# Patient Record
Sex: MALE | Race: ASIAN | NOT HISPANIC OR LATINO
[De-identification: names, ages, dates, MRNs, and addresses within clinical notes are randomized per-mention and may not be internally consistent; named-entity substitution may affect disease eponyms.]

---

## 2021-01-06 PROBLEM — Z00.00 ENCOUNTER FOR PREVENTIVE HEALTH EXAMINATION: Status: ACTIVE | Noted: 2021-01-06

## 2021-01-07 ENCOUNTER — APPOINTMENT (OUTPATIENT)
Dept: UROLOGY | Facility: CLINIC | Age: 41
End: 2021-01-07

## 2021-01-14 ENCOUNTER — APPOINTMENT (OUTPATIENT)
Dept: UROLOGY | Facility: CLINIC | Age: 41
End: 2021-01-14

## 2023-12-27 ENCOUNTER — INPATIENT (INPATIENT)
Facility: HOSPITAL | Age: 43
LOS: 1 days | Discharge: HOME | End: 2023-12-29
Attending: HOSPITALIST | Admitting: HOSPITALIST
Payer: COMMERCIAL

## 2023-12-27 VITALS
TEMPERATURE: 98 F | SYSTOLIC BLOOD PRESSURE: 153 MMHG | RESPIRATION RATE: 16 BRPM | DIASTOLIC BLOOD PRESSURE: 105 MMHG | HEART RATE: 75 BPM | OXYGEN SATURATION: 98 %

## 2023-12-27 DIAGNOSIS — R07.9 CHEST PAIN, UNSPECIFIED: ICD-10-CM

## 2023-12-27 DIAGNOSIS — I10 ESSENTIAL (PRIMARY) HYPERTENSION: ICD-10-CM

## 2023-12-27 DIAGNOSIS — E11.9 TYPE 2 DIABETES MELLITUS WITHOUT COMPLICATIONS: ICD-10-CM

## 2023-12-27 DIAGNOSIS — R07.89 OTHER CHEST PAIN: ICD-10-CM

## 2023-12-27 DIAGNOSIS — R74.01 ELEVATION OF LEVELS OF LIVER TRANSAMINASE LEVELS: ICD-10-CM

## 2023-12-27 LAB
ALBUMIN SERPL ELPH-MCNC: 4.2 G/DL — SIGNIFICANT CHANGE UP (ref 3.3–5)
ALBUMIN SERPL ELPH-MCNC: 4.2 G/DL — SIGNIFICANT CHANGE UP (ref 3.3–5)
ALP SERPL-CCNC: 119 U/L — SIGNIFICANT CHANGE UP (ref 40–120)
ALP SERPL-CCNC: 119 U/L — SIGNIFICANT CHANGE UP (ref 40–120)
ALT FLD-CCNC: 96 U/L — HIGH (ref 4–41)
ALT FLD-CCNC: 96 U/L — HIGH (ref 4–41)
ANION GAP SERPL CALC-SCNC: 12 MMOL/L — SIGNIFICANT CHANGE UP (ref 7–14)
ANION GAP SERPL CALC-SCNC: 12 MMOL/L — SIGNIFICANT CHANGE UP (ref 7–14)
AST SERPL-CCNC: 50 U/L — HIGH (ref 4–40)
AST SERPL-CCNC: 50 U/L — HIGH (ref 4–40)
BASOPHILS # BLD AUTO: 0.05 K/UL — SIGNIFICANT CHANGE UP (ref 0–0.2)
BASOPHILS # BLD AUTO: 0.05 K/UL — SIGNIFICANT CHANGE UP (ref 0–0.2)
BASOPHILS NFR BLD AUTO: 0.6 % — SIGNIFICANT CHANGE UP (ref 0–2)
BASOPHILS NFR BLD AUTO: 0.6 % — SIGNIFICANT CHANGE UP (ref 0–2)
BILIRUB SERPL-MCNC: 0.4 MG/DL — SIGNIFICANT CHANGE UP (ref 0.2–1.2)
BILIRUB SERPL-MCNC: 0.4 MG/DL — SIGNIFICANT CHANGE UP (ref 0.2–1.2)
BUN SERPL-MCNC: 10 MG/DL — SIGNIFICANT CHANGE UP (ref 7–23)
BUN SERPL-MCNC: 10 MG/DL — SIGNIFICANT CHANGE UP (ref 7–23)
CALCIUM SERPL-MCNC: 9.1 MG/DL — SIGNIFICANT CHANGE UP (ref 8.4–10.5)
CALCIUM SERPL-MCNC: 9.1 MG/DL — SIGNIFICANT CHANGE UP (ref 8.4–10.5)
CHLORIDE SERPL-SCNC: 98 MMOL/L — SIGNIFICANT CHANGE UP (ref 98–107)
CHLORIDE SERPL-SCNC: 98 MMOL/L — SIGNIFICANT CHANGE UP (ref 98–107)
CO2 SERPL-SCNC: 26 MMOL/L — SIGNIFICANT CHANGE UP (ref 22–31)
CO2 SERPL-SCNC: 26 MMOL/L — SIGNIFICANT CHANGE UP (ref 22–31)
CREAT SERPL-MCNC: 0.72 MG/DL — SIGNIFICANT CHANGE UP (ref 0.5–1.3)
CREAT SERPL-MCNC: 0.72 MG/DL — SIGNIFICANT CHANGE UP (ref 0.5–1.3)
EGFR: 116 ML/MIN/1.73M2 — SIGNIFICANT CHANGE UP
EGFR: 116 ML/MIN/1.73M2 — SIGNIFICANT CHANGE UP
EOSINOPHIL # BLD AUTO: 0.14 K/UL — SIGNIFICANT CHANGE UP (ref 0–0.5)
EOSINOPHIL # BLD AUTO: 0.14 K/UL — SIGNIFICANT CHANGE UP (ref 0–0.5)
EOSINOPHIL NFR BLD AUTO: 1.7 % — SIGNIFICANT CHANGE UP (ref 0–6)
EOSINOPHIL NFR BLD AUTO: 1.7 % — SIGNIFICANT CHANGE UP (ref 0–6)
GLUCOSE BLDC GLUCOMTR-MCNC: 193 MG/DL — HIGH (ref 70–99)
GLUCOSE BLDC GLUCOMTR-MCNC: 193 MG/DL — HIGH (ref 70–99)
GLUCOSE BLDC GLUCOMTR-MCNC: 212 MG/DL — HIGH (ref 70–99)
GLUCOSE BLDC GLUCOMTR-MCNC: 212 MG/DL — HIGH (ref 70–99)
GLUCOSE SERPL-MCNC: 262 MG/DL — HIGH (ref 70–99)
GLUCOSE SERPL-MCNC: 262 MG/DL — HIGH (ref 70–99)
HCT VFR BLD CALC: 48.1 % — SIGNIFICANT CHANGE UP (ref 39–50)
HCT VFR BLD CALC: 48.1 % — SIGNIFICANT CHANGE UP (ref 39–50)
HGB BLD-MCNC: 16.2 G/DL — SIGNIFICANT CHANGE UP (ref 13–17)
HGB BLD-MCNC: 16.2 G/DL — SIGNIFICANT CHANGE UP (ref 13–17)
IANC: 5.12 K/UL — SIGNIFICANT CHANGE UP (ref 1.8–7.4)
IANC: 5.12 K/UL — SIGNIFICANT CHANGE UP (ref 1.8–7.4)
IMM GRANULOCYTES NFR BLD AUTO: 0.5 % — SIGNIFICANT CHANGE UP (ref 0–0.9)
IMM GRANULOCYTES NFR BLD AUTO: 0.5 % — SIGNIFICANT CHANGE UP (ref 0–0.9)
LYMPHOCYTES # BLD AUTO: 2.37 K/UL — SIGNIFICANT CHANGE UP (ref 1–3.3)
LYMPHOCYTES # BLD AUTO: 2.37 K/UL — SIGNIFICANT CHANGE UP (ref 1–3.3)
LYMPHOCYTES # BLD AUTO: 28.2 % — SIGNIFICANT CHANGE UP (ref 13–44)
LYMPHOCYTES # BLD AUTO: 28.2 % — SIGNIFICANT CHANGE UP (ref 13–44)
MCHC RBC-ENTMCNC: 27.5 PG — SIGNIFICANT CHANGE UP (ref 27–34)
MCHC RBC-ENTMCNC: 27.5 PG — SIGNIFICANT CHANGE UP (ref 27–34)
MCHC RBC-ENTMCNC: 33.7 GM/DL — SIGNIFICANT CHANGE UP (ref 32–36)
MCHC RBC-ENTMCNC: 33.7 GM/DL — SIGNIFICANT CHANGE UP (ref 32–36)
MCV RBC AUTO: 81.5 FL — SIGNIFICANT CHANGE UP (ref 80–100)
MCV RBC AUTO: 81.5 FL — SIGNIFICANT CHANGE UP (ref 80–100)
MONOCYTES # BLD AUTO: 0.68 K/UL — SIGNIFICANT CHANGE UP (ref 0–0.9)
MONOCYTES # BLD AUTO: 0.68 K/UL — SIGNIFICANT CHANGE UP (ref 0–0.9)
MONOCYTES NFR BLD AUTO: 8.1 % — SIGNIFICANT CHANGE UP (ref 2–14)
MONOCYTES NFR BLD AUTO: 8.1 % — SIGNIFICANT CHANGE UP (ref 2–14)
NEUTROPHILS # BLD AUTO: 5.12 K/UL — SIGNIFICANT CHANGE UP (ref 1.8–7.4)
NEUTROPHILS # BLD AUTO: 5.12 K/UL — SIGNIFICANT CHANGE UP (ref 1.8–7.4)
NEUTROPHILS NFR BLD AUTO: 60.9 % — SIGNIFICANT CHANGE UP (ref 43–77)
NEUTROPHILS NFR BLD AUTO: 60.9 % — SIGNIFICANT CHANGE UP (ref 43–77)
NRBC # BLD: 0 /100 WBCS — SIGNIFICANT CHANGE UP (ref 0–0)
NRBC # BLD: 0 /100 WBCS — SIGNIFICANT CHANGE UP (ref 0–0)
NRBC # FLD: 0 K/UL — SIGNIFICANT CHANGE UP (ref 0–0)
NRBC # FLD: 0 K/UL — SIGNIFICANT CHANGE UP (ref 0–0)
PLATELET # BLD AUTO: 288 K/UL — SIGNIFICANT CHANGE UP (ref 150–400)
PLATELET # BLD AUTO: 288 K/UL — SIGNIFICANT CHANGE UP (ref 150–400)
POTASSIUM SERPL-MCNC: 4.2 MMOL/L — SIGNIFICANT CHANGE UP (ref 3.5–5.3)
POTASSIUM SERPL-MCNC: 4.2 MMOL/L — SIGNIFICANT CHANGE UP (ref 3.5–5.3)
POTASSIUM SERPL-SCNC: 4.2 MMOL/L — SIGNIFICANT CHANGE UP (ref 3.5–5.3)
POTASSIUM SERPL-SCNC: 4.2 MMOL/L — SIGNIFICANT CHANGE UP (ref 3.5–5.3)
PROT SERPL-MCNC: 7.9 G/DL — SIGNIFICANT CHANGE UP (ref 6–8.3)
PROT SERPL-MCNC: 7.9 G/DL — SIGNIFICANT CHANGE UP (ref 6–8.3)
RBC # BLD: 5.9 M/UL — HIGH (ref 4.2–5.8)
RBC # BLD: 5.9 M/UL — HIGH (ref 4.2–5.8)
RBC # FLD: 12 % — SIGNIFICANT CHANGE UP (ref 10.3–14.5)
RBC # FLD: 12 % — SIGNIFICANT CHANGE UP (ref 10.3–14.5)
SODIUM SERPL-SCNC: 136 MMOL/L — SIGNIFICANT CHANGE UP (ref 135–145)
SODIUM SERPL-SCNC: 136 MMOL/L — SIGNIFICANT CHANGE UP (ref 135–145)
TROPONIN T, HIGH SENSITIVITY RESULT: 6 NG/L — SIGNIFICANT CHANGE UP
TROPONIN T, HIGH SENSITIVITY RESULT: 6 NG/L — SIGNIFICANT CHANGE UP
WBC # BLD: 8.4 K/UL — SIGNIFICANT CHANGE UP (ref 3.8–10.5)
WBC # BLD: 8.4 K/UL — SIGNIFICANT CHANGE UP (ref 3.8–10.5)
WBC # FLD AUTO: 8.4 K/UL — SIGNIFICANT CHANGE UP (ref 3.8–10.5)
WBC # FLD AUTO: 8.4 K/UL — SIGNIFICANT CHANGE UP (ref 3.8–10.5)

## 2023-12-27 PROCEDURE — 71046 X-RAY EXAM CHEST 2 VIEWS: CPT | Mod: 26

## 2023-12-27 PROCEDURE — 99285 EMERGENCY DEPT VISIT HI MDM: CPT

## 2023-12-27 RX ORDER — DEXTROSE 50 % IN WATER 50 %
25 SYRINGE (ML) INTRAVENOUS ONCE
Refills: 0 | Status: DISCONTINUED | OUTPATIENT
Start: 2023-12-27 | End: 2023-12-29

## 2023-12-27 RX ORDER — INSULIN LISPRO 100/ML
VIAL (ML) SUBCUTANEOUS AT BEDTIME
Refills: 0 | Status: DISCONTINUED | OUTPATIENT
Start: 2023-12-27 | End: 2023-12-29

## 2023-12-27 RX ORDER — DEXTROSE 50 % IN WATER 50 %
12.5 SYRINGE (ML) INTRAVENOUS ONCE
Refills: 0 | Status: DISCONTINUED | OUTPATIENT
Start: 2023-12-27 | End: 2023-12-29

## 2023-12-27 RX ORDER — ASPIRIN/CALCIUM CARB/MAGNESIUM 324 MG
325 TABLET ORAL ONCE
Refills: 0 | Status: COMPLETED | OUTPATIENT
Start: 2023-12-27 | End: 2023-12-27

## 2023-12-27 RX ORDER — GLUCAGON INJECTION, SOLUTION 0.5 MG/.1ML
1 INJECTION, SOLUTION SUBCUTANEOUS ONCE
Refills: 0 | Status: DISCONTINUED | OUTPATIENT
Start: 2023-12-27 | End: 2023-12-29

## 2023-12-27 RX ORDER — DEXTROSE 50 % IN WATER 50 %
15 SYRINGE (ML) INTRAVENOUS ONCE
Refills: 0 | Status: DISCONTINUED | OUTPATIENT
Start: 2023-12-27 | End: 2023-12-29

## 2023-12-27 RX ORDER — SODIUM CHLORIDE 9 MG/ML
1000 INJECTION, SOLUTION INTRAVENOUS
Refills: 0 | Status: DISCONTINUED | OUTPATIENT
Start: 2023-12-27 | End: 2023-12-29

## 2023-12-27 RX ORDER — METOPROLOL TARTRATE 50 MG
25 TABLET ORAL EVERY 6 HOURS
Refills: 0 | Status: DISCONTINUED | OUTPATIENT
Start: 2023-12-27 | End: 2023-12-29

## 2023-12-27 RX ORDER — INSULIN LISPRO 100/ML
VIAL (ML) SUBCUTANEOUS
Refills: 0 | Status: DISCONTINUED | OUTPATIENT
Start: 2023-12-27 | End: 2023-12-29

## 2023-12-27 RX ORDER — PANTOPRAZOLE SODIUM 20 MG/1
40 TABLET, DELAYED RELEASE ORAL
Refills: 0 | Status: DISCONTINUED | OUTPATIENT
Start: 2023-12-27 | End: 2023-12-29

## 2023-12-27 RX ORDER — ASPIRIN/CALCIUM CARB/MAGNESIUM 324 MG
81 TABLET ORAL DAILY
Refills: 0 | Status: DISCONTINUED | OUTPATIENT
Start: 2023-12-27 | End: 2023-12-29

## 2023-12-27 RX ADMIN — Medication 325 MILLIGRAM(S): at 15:49

## 2023-12-27 RX ADMIN — Medication 25 MILLIGRAM(S): at 23:23

## 2023-12-27 NOTE — ED PROVIDER NOTE - PROGRESS NOTE DETAILS
MD Summer: Pt signed out to me by Dr Chavira. Labs resulted, troponin 6. EP note recommending metoprolol to slow heart rate and cardiac CTA. Spoke with Dr Geller, cardiology attending and pt will be admitted to his service.

## 2023-12-27 NOTE — CONSULT NOTE ADULT - SUBJECTIVE AND OBJECTIVE BOX
EP Attending  HISTORY OF PRESENT ILLNESS: HPI:  Mr Marshall is a very pleasant 43yoM who presents to our office with worsening/progressive chest discomfort.  Described as heaviness that is substernal/mid-chest, nonradiating, occuring while climbing stairs at work.  He is a .  Has HTN, and abdominal obesity.  Takes Amlodipine for HTN and PPI for GERD.    PAST MEDICAL & SURGICAL HISTORY:  HTN  Obesity    MEDICATIONS  (STANDING):  metoprolol tartrate 25 milliGRAM(s) Oral every 6 hours      Allergies    No Known Allergies    Intolerances      FAMILY HISTORY:    Non-contributary for premature coronary disease or sudden cardiac death    SOCIAL HISTORY:    [x ] Non-smoker  [ ] Smoker  [x ] Alcohol    PHYSICAL EXAM:  T(C): 36.7 (12-27-23 @ 15:52), Max: 36.8 (12-27-23 @ 14:47)  HR: 87 (12-27-23 @ 15:52) (75 - 87)  BP: 125/94 (12-27-23 @ 15:52) (125/94 - 153/105)  RR: 16 (12-27-23 @ 15:52) (16 - 16)  SpO2: 98% (12-27-23 @ 15:52) (98% - 98%)  Wt(kg): --    General: Well nourished, no acute distress, alert and oriented x 3  Head: normocephalic, no trauma  Neck: no JVD, no bruit, supple, not enlarged  CV: S1S2, no S3, regular rate, rhythm is SINUS, no murmurs.    Lungs: clear BL, no rales or wheezes  Abdomen: bowel sounds +, soft, nontender, nondistended  Extremities: no clubbing, cyanosis or edema  Neuro: Moves all 4 extremities, sensation intact x 4 extremities  Skin: warm and moist, normal turgor  Psych: Mood and affect are appropriate for circumstances  MSK: normal range of motion and strength x4 extremities.    TELEMETRY: NSR	    ECG: NSR, lateral T-waves flat. 	  Echo: NSR  NST: normal perfusion, 11.5min of exercise on Martir protocol without ischemic changes on EKG.  	  	  LABS:	 	                          16.2   8.40  )-----------( 288      ( 27 Dec 2023 15:49 )             48.1     12-27    136  |  98  |  10  ----------------------------<  262<H>  4.2   |  26  |  0.72    Ca    9.1      27 Dec 2023 15:49    TPro  7.9  /  Alb  4.2  /  TBili  0.4  /  DBili  x   /  AST  50<H>  /  ALT  96<H>  /  AlkPhos  119  12-27    ASSESSMENT/PLAN: Mr Marshall is a very pleasant 43y male here with persistent, progressive chest pain.  Concerning for angina, which is in contrast to his reassuring office workup with normal echo, and good exercise capacity on treadmill / normal nuclear myocardial perfusion imaging.  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  Heartrate a bit too fast, 70s-80s at rest, for desired test: Cardiac CTA.  Continue metoprolol for hypertension.  Will start metoprolol to slow heartrate down towards 60.  Holding parameter for drug set at 50bpm.  If we find any coronary calcium, will have evidence to start Statin therapy early.  Hopefully, no multivessel CAD (patient concerned about CAD due to demographics and family history).  Will follow with you.      Myles Keller M.D.  Cardiac Electrophysiology    office [phone]  pager [phone] EP Attending  HISTORY OF PRESENT ILLNESS: HPI:  Mr Marshall is a very pleasant 43yoM who presents to our office with worsening/progressive chest discomfort.  Described as heaviness that is substernal/mid-chest, nonradiating, occuring while climbing stairs at work.  He is a .  Has HTN, and abdominal obesity.  Takes Amlodipine for HTN and PPI for GERD.    PAST MEDICAL & SURGICAL HISTORY:  HTN  Obesity    MEDICATIONS  (STANDING):  metoprolol tartrate 25 milliGRAM(s) Oral every 6 hours      Allergies    No Known Allergies    Intolerances      FAMILY HISTORY:    Non-contributary for premature coronary disease or sudden cardiac death    SOCIAL HISTORY:    [x ] Non-smoker  [ ] Smoker  [x ] Alcohol    PHYSICAL EXAM:  T(C): 36.7 (12-27-23 @ 15:52), Max: 36.8 (12-27-23 @ 14:47)  HR: 87 (12-27-23 @ 15:52) (75 - 87)  BP: 125/94 (12-27-23 @ 15:52) (125/94 - 153/105)  RR: 16 (12-27-23 @ 15:52) (16 - 16)  SpO2: 98% (12-27-23 @ 15:52) (98% - 98%)  Wt(kg): --    General: Well nourished, no acute distress, alert and oriented x 3  Head: normocephalic, no trauma  Neck: no JVD, no bruit, supple, not enlarged  CV: S1S2, no S3, regular rate, rhythm is SINUS, no murmurs.    Lungs: clear BL, no rales or wheezes  Abdomen: bowel sounds +, soft, nontender, nondistended  Extremities: no clubbing, cyanosis or edema  Neuro: Moves all 4 extremities, sensation intact x 4 extremities  Skin: warm and moist, normal turgor  Psych: Mood and affect are appropriate for circumstances  MSK: normal range of motion and strength x4 extremities.    TELEMETRY: NSR	    ECG: NSR, lateral T-waves flat. 	  Echo: NSR  NST: normal perfusion, 11.5min of exercise on Martir protocol without ischemic changes on EKG.  	  	  LABS:	 	                          16.2   8.40  )-----------( 288      ( 27 Dec 2023 15:49 )             48.1     12-27    136  |  98  |  10  ----------------------------<  262<H>  4.2   |  26  |  0.72    Ca    9.1      27 Dec 2023 15:49    TPro  7.9  /  Alb  4.2  /  TBili  0.4  /  DBili  x   /  AST  50<H>  /  ALT  96<H>  /  AlkPhos  119  12-27    ASSESSMENT/PLAN: Mr Marshall is a very pleasant 43y male here with persistent, progressive chest pain.  Concerning for angina, which is in contrast to his reassuring office workup with normal echo, and good exercise capacity on treadmill / normal nuclear myocardial perfusion imaging.  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  Heartrate a bit too fast, 70s-80s at rest, for desired test: Cardiac CTA.  Continue metoprolol for hypertension.  Will start metoprolol to slow heartrate down towards 60.  Holding parameter for drug set at 50bpm.  If we find any coronary calcium, will have evidence to start Statin therapy early.  Hopefully, no multivessel CAD (patient concerned about CAD due to demographics and family history).  Will follow with you.      Myles Keller M.D.  Cardiac Electrophysiology    office [phone]  pager [phone]

## 2023-12-27 NOTE — ED ADULT NURSE REASSESSMENT NOTE - NS ED NURSE REASSESS COMMENT FT1
Report given to melecio PAINTER, patient resting comfortably in stretcher, breathing even and unlabored. Offers no complaints at this time. Pending CTA & US. NSR on cardiac monitor.

## 2023-12-27 NOTE — ED ADULT TRIAGE NOTE - CHIEF COMPLAINT QUOTE
Patient c/o chest pain x few months, sent in by MD for admission. Denies SOB, fever, cough, dizziness, nausea. Phx HTN

## 2023-12-27 NOTE — ED PROVIDER NOTE - OBJECTIVE STATEMENT
43M, preDM, ?HTN (no meds), who is sent from cardiology clinic for possible admission. For the past several months, reports L sided chest pain. Non-radiating. Non-smoker. Denies illicits. Denies hx of VTE. No leg swelling. Pain is becoming more frequent and thus he was sent from clinic (MD Gustafson) for possible admission. No belly pain, nvd, dysuria, hematuria, recent travel, trauma, syncope.

## 2023-12-27 NOTE — ED ADULT NURSE NOTE - OBJECTIVE STATEMENT
43 year old male, received to [facility]. A&Ox4, ambulatory. Respirations equal and unlabored. Past medical history of kidney stones. Pt c/o non radiating midsternal chest pain x1 month. No pallor or diaphoresis noted. Pt send in by MD for cardiac work up. Pt denies SOB, palpitations, dizziness, blurry vision, headache, numbness, tingling, fevers, chills, any other complaints. Neuro intact. PERRLA. EKG performed and placed in chart. Skin dry and intact. 20G IV placed to L antecubital space. Labs obtained. Medicated as per EMR orders. Pending XR. No acute distress noted. Safety maintained. 43 year old male, received to [facility]. A&Ox4, ambulatory. Respirations equal and unlabored. Past medical history of kidney stones. Pt c/o non radiating midsternal chest pain x1 month. No pallor or diaphoresis noted. Pt send in by MD for cardiac work up. Pt denies SOB, palpitations, dizziness, blurry vision, headache, numbness, tingling, fevers, chills, any other complaints. Neuro intact. PERRLA. EKG performed and placed in chart. Skin dry and intact. 20G IV placed to L antecubital space. Labs obtained. Medicated as per EMR orders. Pending XR. No acute distress noted. Safety maintained.

## 2023-12-27 NOTE — CHART NOTE - NSCHARTNOTEFT_GEN_A_CORE
Sent from office for chest pain by Dr. Gustafson, HAs had a stress in [place] that was normal but continues to have pain, will need cath vs CCTA depending on labs and EKG    Maria Luisa Geller MD  Pager: [phone] Sent from office for chest pain by Dr. Gustafson, HAs had a stress in [place] that was normal but continues to have pain, will need cath vs CCTA depending on labs and EKG    Maria Luisa Geller MD  Pager: [phone]

## 2023-12-27 NOTE — CONSULT NOTE ADULT - SUBJECTIVE AND OBJECTIVE BOX
INTERVENTIONAL CARDIOLOGY  **************************************    DATE OF SERVICE: 12-27-23    HISTORY OF PRESENT ILLNESS: HPI:  Pt is a 43M,  pmh of [facility], who is sent from office for central burning chest pain that can get worse with food. He also reports an exertional component and some SOB. 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 Pain is left sided, non radiating denies any palpitations, orthopnea, PND or recent fevers. HE does not smoke, denies any illicit drug use No belly pain, nvd, dysuria, hematuria, recent travel, trauma, syncope.    PMHX: Hypertension    PSHX: Status post left elbow surgery.    Social HX:   Alcohol - SOCIAL DRINKER   Smoking - NEVER SMOKED    Fam HX:   Positive for coronary artery disease    Allergies: No Known Allergies    Medications:  amlodipine 5 mg tablet 1 TABLET DAILY  naproxen 500 mg tablet,delayed release 1 TABLET Q12H PRN PAIN. TAKE WITH MEALS  omeprazole 20 mg tablet,delayed release 1 TABLET DAILY        REVIEW OF SYSTEMS:  [ ]chest pain  [  ]shortness of breath  [  ]palpitations  [  ]syncope  [ ]near syncope [ ]upper extremity weakness   [ ] lower extremity weakness  [  ]diplopia  [  ]altered mental status   [  ]fevers  [ ]chills [ ]nausea  [ ]vomiting  [  ]dysphagia    [ ]abdominal pain  [ ]melena  [ ]BRBPR    [  ]epistaxis  [  ]rash    [ ]lower extremity edema    [X] All others negative	  [ ] Unable to obtain      LABS:	 	    CARDIAC MARKERS:               16.2   8.40  )-----------( 288      ( 27 Dec 2023 15:49 )             48.1     Hb Trend: 16.2<--    12-27    136  |  98  |  10  ----------------------------<  262<H>  4.2   |  26  |  0.72    Ca    9.1      27 Dec 2023 15:49    TPro  7.9  /  Alb  4.2  /  TBili  0.4  /  DBili  x   /  AST  50<H>  /  ALT  96<H>  /  AlkPhos  119  12-27    Creatinine Trend: 0.72<--        PHYSICAL EXAM:  T(C): 36.7 (12-27-23 @ 18:05), Max: 36.8 (12-27-23 @ 14:47)  HR: 87 (12-27-23 @ 18:05) (75 - 87)  BP: 125/94 (12-27-23 @ 18:05) (125/94 - 153/105)  RR: 16 (12-27-23 @ 15:52) (16 - 16)  SpO2: 98% (12-27-23 @ 15:52) (98% - 98%)  Wt(kg): --     I&O's Summary      General:  Alert and Oriented * 3.   Head: Normocephalic and atraumatic.   Neck: No JVD. No bruits. Supple. Does not appear to be enlarged.   Cardiovascular: + S1,S2 ; RRR Soft systolic murmur at the left lower sternal border. No rubs noted.    Lungs: CTA b/l. No rhonchi, rales or wheezes.   Abdomen: + BS, soft. Non tender. Non distended. No rebound. No guarding.   Extremities: No clubbing/cyanosis/edema.   Neurologic: Moves all four extremities. Full range of motion.   Skin: Warm and moist. The patient's skin has normal elasticity and good skin turgor.   Psychiatric: Appropriate mood and affect.  Musculoskeletal: Normal range of motion, normal strength     TELEMETRY: 	  NSR    ECG:  	NSR    Stress 06/2023  Normal myocardial perfusion SPECT images No evidence of stress induced ischemia or infarction.  Normal left ventricular size and function.  Calculated EF is: 65%     Holter 06/2023  Sinus rhythm.  Rare APCs and VPCs    TTE  1. Normal left ventricular size and function.  Mild diastolic dysfunction.  2. Normal left atrial size  3. Right atrial cavity is normal in size.  4. Normal right ventricular size and function.  5. Normal trileaflet aortic valve opening.  6. Normal mitral valve opening.  7. Normal appearing tricuspid valve with mild tricuspid  regurgitation.  8. Pulmonic valve is grossly normal, yet poorly visualized  with no doppler evidence for pulmonic stenosis.  9. No evidence of significant pericardial effusion.  10. The aortic root is normal.  11. Normal pulmonary artery.  12. IVC is normal with respiratory variation.  FULL STUDY DONE INCLUDING M-MODE RECORDING,  SPECTRAL DOPPLER AND COLOR FLOW  ECHOCARDIOGRAPHY      ASSESSMENT/PLAN: Pt is a 43M,  pmh of [facility], who is sent from office for central burning chest pain that can get worse with food. He also reports an exertional component and some SOB. 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 Pain is left sided, non radiating denies any palpitations, orthopnea, PND or recent fevers. HE does not smoke, denies any illicit drug use No belly pain, nvd, dysuria, hematuria, recent travel, trauma, syncope.    1. Chest Pain  - atypical with typical features  - trop negative, EKG non ischemic  - check CCTA  - start Metoprolol 25 1 6 to lower heart rate for CCTA  - start PP  - c/w Amlodipine  - if CCTA negative will start NSAIDS for MSK pain  - check RUQ u/s    Maria Luisa Geller MD  Pager: [phone]      INTERVENTIONAL CARDIOLOGY  **************************************    DATE OF SERVICE: 12-27-23    HISTORY OF PRESENT ILLNESS: HPI:  Pt is a 43M,  pmh of [facility], who is sent from office for central burning chest pain that can get worse with food. He also reports an exertional component and some SOB. 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 Pain is left sided, non radiating denies any palpitations, orthopnea, PND or recent fevers. HE does not smoke, denies any illicit drug use No belly pain, nvd, dysuria, hematuria, recent travel, trauma, syncope.    PMHX: Hypertension    PSHX: Status post left elbow surgery.    Social HX:   Alcohol - SOCIAL DRINKER   Smoking - NEVER SMOKED    Fam HX:   Positive for coronary artery disease    Allergies: No Known Allergies    Medications:  amlodipine 5 mg tablet 1 TABLET DAILY  naproxen 500 mg tablet,delayed release 1 TABLET Q12H PRN PAIN. TAKE WITH MEALS  omeprazole 20 mg tablet,delayed release 1 TABLET DAILY        REVIEW OF SYSTEMS:  [ ]chest pain  [  ]shortness of breath  [  ]palpitations  [  ]syncope  [ ]near syncope [ ]upper extremity weakness   [ ] lower extremity weakness  [  ]diplopia  [  ]altered mental status   [  ]fevers  [ ]chills [ ]nausea  [ ]vomiting  [  ]dysphagia    [ ]abdominal pain  [ ]melena  [ ]BRBPR    [  ]epistaxis  [  ]rash    [ ]lower extremity edema    [X] All others negative	  [ ] Unable to obtain      LABS:	 	    CARDIAC MARKERS:               16.2   8.40  )-----------( 288      ( 27 Dec 2023 15:49 )             48.1     Hb Trend: 16.2<--    12-27    136  |  98  |  10  ----------------------------<  262<H>  4.2   |  26  |  0.72    Ca    9.1      27 Dec 2023 15:49    TPro  7.9  /  Alb  4.2  /  TBili  0.4  /  DBili  x   /  AST  50<H>  /  ALT  96<H>  /  AlkPhos  119  12-27    Creatinine Trend: 0.72<--        PHYSICAL EXAM:  T(C): 36.7 (12-27-23 @ 18:05), Max: 36.8 (12-27-23 @ 14:47)  HR: 87 (12-27-23 @ 18:05) (75 - 87)  BP: 125/94 (12-27-23 @ 18:05) (125/94 - 153/105)  RR: 16 (12-27-23 @ 15:52) (16 - 16)  SpO2: 98% (12-27-23 @ 15:52) (98% - 98%)  Wt(kg): --     I&O's Summary      General:  Alert and Oriented * 3.   Head: Normocephalic and atraumatic.   Neck: No JVD. No bruits. Supple. Does not appear to be enlarged.   Cardiovascular: + S1,S2 ; RRR Soft systolic murmur at the left lower sternal border. No rubs noted.    Lungs: CTA b/l. No rhonchi, rales or wheezes.   Abdomen: + BS, soft. Non tender. Non distended. No rebound. No guarding.   Extremities: No clubbing/cyanosis/edema.   Neurologic: Moves all four extremities. Full range of motion.   Skin: Warm and moist. The patient's skin has normal elasticity and good skin turgor.   Psychiatric: Appropriate mood and affect.  Musculoskeletal: Normal range of motion, normal strength     TELEMETRY: 	  NSR    ECG:  	NSR    Stress 06/2023  Normal myocardial perfusion SPECT images No evidence of stress induced ischemia or infarction.  Normal left ventricular size and function.  Calculated EF is: 65%     Holter 06/2023  Sinus rhythm.  Rare APCs and VPCs    TTE  1. Normal left ventricular size and function.  Mild diastolic dysfunction.  2. Normal left atrial size  3. Right atrial cavity is normal in size.  4. Normal right ventricular size and function.  5. Normal trileaflet aortic valve opening.  6. Normal mitral valve opening.  7. Normal appearing tricuspid valve with mild tricuspid  regurgitation.  8. Pulmonic valve is grossly normal, yet poorly visualized  with no doppler evidence for pulmonic stenosis.  9. No evidence of significant pericardial effusion.  10. The aortic root is normal.  11. Normal pulmonary artery.  12. IVC is normal with respiratory variation.  FULL STUDY DONE INCLUDING M-MODE RECORDING,  SPECTRAL DOPPLER AND COLOR FLOW  ECHOCARDIOGRAPHY      ASSESSMENT/PLAN: Pt is a 43M,  pmh of [facility], who is sent from office for central burning chest pain that can get worse with food. He also reports an exertional component and some SOB. 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 Pain is left sided, non radiating denies any palpitations, orthopnea, PND or recent fevers. HE does not smoke, denies any illicit drug use No belly pain, nvd, dysuria, hematuria, recent travel, trauma, syncope.    1. Chest Pain  - atypical with typical features  - trop negative, EKG non ischemic  - check CCTA  - start Metoprolol 25 1 6 to lower heart rate for CCTA  - start PP  - c/w Amlodipine  - if CCTA negative will start NSAIDS for MSK pain  - check RUQ u/s    Maria Luisa Geller MD  Pager: [phone]      INTERVENTIONAL CARDIOLOGY  **************************************    DATE OF SERVICE: 12-27-23    HISTORY OF PRESENT ILLNESS: HPI:  Pt is a 43M,  pmh of [facility], who is sent from office for central burning chest pain that can get worse with food. He also reports an exertional component and some SOB. 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 Pain is left sided, non radiating denies any palpitations, orthopnea, PND or recent fevers. HE does not smoke, denies any illicit drug use No belly pain, nvd, dysuria, hematuria, recent travel, trauma, syncope.    PMHX: Hypertension    PSHX: Status post left elbow surgery.    Social HX:   Alcohol - SOCIAL DRINKER   Smoking - NEVER SMOKED    Fam HX:   Positive for coronary artery disease    Allergies: No Known Allergies    Medications:  amlodipine 5 mg tablet 1 TABLET DAILY  naproxen 500 mg tablet,delayed release 1 TABLET Q12H PRN PAIN. TAKE WITH MEALS  omeprazole 20 mg tablet,delayed release 1 TABLET DAILY        REVIEW OF SYSTEMS:  [ ]chest pain  [  ]shortness of breath  [  ]palpitations  [  ]syncope  [ ]near syncope [ ]upper extremity weakness   [ ] lower extremity weakness  [  ]diplopia  [  ]altered mental status   [  ]fevers  [ ]chills [ ]nausea  [ ]vomiting  [  ]dysphagia    [ ]abdominal pain  [ ]melena  [ ]BRBPR    [  ]epistaxis  [  ]rash    [ ]lower extremity edema    [X] All others negative	  [ ] Unable to obtain      LABS:	 	    CARDIAC MARKERS:               16.2   8.40  )-----------( 288      ( 27 Dec 2023 15:49 )             48.1     Hb Trend: 16.2<--    12-27    136  |  98  |  10  ----------------------------<  262<H>  4.2   |  26  |  0.72    Ca    9.1      27 Dec 2023 15:49    TPro  7.9  /  Alb  4.2  /  TBili  0.4  /  DBili  x   /  AST  50<H>  /  ALT  96<H>  /  AlkPhos  119  12-27    Creatinine Trend: 0.72<--        PHYSICAL EXAM:  T(C): 36.7 (12-27-23 @ 18:05), Max: 36.8 (12-27-23 @ 14:47)  HR: 87 (12-27-23 @ 18:05) (75 - 87)  BP: 125/94 (12-27-23 @ 18:05) (125/94 - 153/105)  RR: 16 (12-27-23 @ 15:52) (16 - 16)  SpO2: 98% (12-27-23 @ 15:52) (98% - 98%)  Wt(kg): --     I&O's Summary      General:  Alert and Oriented * 3.   Head: Normocephalic and atraumatic.   Neck: No JVD. No bruits. Supple. Does not appear to be enlarged.   Cardiovascular: + S1,S2 ; RRR Soft systolic murmur at the left lower sternal border. No rubs noted.    Lungs: CTA b/l. No rhonchi, rales or wheezes.   Abdomen: + BS, soft. Non tender. Non distended. No rebound. No guarding.   Extremities: No clubbing/cyanosis/edema.   Neurologic: Moves all four extremities. Full range of motion.   Skin: Warm and moist. The patient's skin has normal elasticity and good skin turgor.   Psychiatric: Appropriate mood and affect.  Musculoskeletal: Normal range of motion, normal strength     TELEMETRY: 	  NSR    ECG:  	NSR    Stress 06/2023  Normal myocardial perfusion SPECT images No evidence of stress induced ischemia or infarction.  Normal left ventricular size and function.  Calculated EF is: 65%     Holter 06/2023  Sinus rhythm.  Rare APCs and VPCs    TTE  1. Normal left ventricular size and function.  Mild diastolic dysfunction.  2. Normal left atrial size  3. Right atrial cavity is normal in size.  4. Normal right ventricular size and function.  5. Normal trileaflet aortic valve opening.  6. Normal mitral valve opening.  7. Normal appearing tricuspid valve with mild tricuspid  regurgitation.  8. Pulmonic valve is grossly normal, yet poorly visualized  with no doppler evidence for pulmonic stenosis.  9. No evidence of significant pericardial effusion.  10. The aortic root is normal.  11. Normal pulmonary artery.  12. IVC is normal with respiratory variation.  FULL STUDY DONE INCLUDING M-MODE RECORDING,  SPECTRAL DOPPLER AND COLOR FLOW  ECHOCARDIOGRAPHY      ASSESSMENT/PLAN: Pt is a 43M,  pmh of [facility], who is sent from office for central burning chest pain that can get worse with food. He also reports an exertional component and some SOB. 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 Pain is left sided, non radiating denies any palpitations, orthopnea, PND or recent fevers. HE does not smoke, denies any illicit drug use No belly pain, nvd, dysuria, hematuria, recent travel, trauma, syncope.    1. Chest Pain  - atypical with typical features  - trop negative, EKG non ischemic, recent negative stress where he exercised for almost 12 minus and had no perfusion defects on stress imaging  - check CCTA for atypical pain and risk factors  - start Metoprolol 25 q 6 hours to lower heart rate for CCTA  - start PPI  - c/w Amlodipine  - if CCTA negative will start NSAIDS for MSK pain  - check RUQ u/s    Maria Luisa Geller MD  Pager: [phone]      INTERVENTIONAL CARDIOLOGY  **************************************    DATE OF SERVICE: 12-27-23    HISTORY OF PRESENT ILLNESS: HPI:  Pt is a 43M,  pmh of [facility], who is sent from office for central burning chest pain that can get worse with food. He also reports an exertional component and some SOB. 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 Pain is left sided, non radiating denies any palpitations, orthopnea, PND or recent fevers. HE does not smoke, denies any illicit drug use No belly pain, nvd, dysuria, hematuria, recent travel, trauma, syncope.    PMHX: Hypertension    PSHX: Status post left elbow surgery.    Social HX:   Alcohol - SOCIAL DRINKER   Smoking - NEVER SMOKED    Fam HX:   Positive for coronary artery disease    Allergies: No Known Allergies    Medications:  amlodipine 5 mg tablet 1 TABLET DAILY  naproxen 500 mg tablet,delayed release 1 TABLET Q12H PRN PAIN. TAKE WITH MEALS  omeprazole 20 mg tablet,delayed release 1 TABLET DAILY        REVIEW OF SYSTEMS:  [ ]chest pain  [  ]shortness of breath  [  ]palpitations  [  ]syncope  [ ]near syncope [ ]upper extremity weakness   [ ] lower extremity weakness  [  ]diplopia  [  ]altered mental status   [  ]fevers  [ ]chills [ ]nausea  [ ]vomiting  [  ]dysphagia    [ ]abdominal pain  [ ]melena  [ ]BRBPR    [  ]epistaxis  [  ]rash    [ ]lower extremity edema    [X] All others negative	  [ ] Unable to obtain      LABS:	 	    CARDIAC MARKERS:               16.2   8.40  )-----------( 288      ( 27 Dec 2023 15:49 )             48.1     Hb Trend: 16.2<--    12-27    136  |  98  |  10  ----------------------------<  262<H>  4.2   |  26  |  0.72    Ca    9.1      27 Dec 2023 15:49    TPro  7.9  /  Alb  4.2  /  TBili  0.4  /  DBili  x   /  AST  50<H>  /  ALT  96<H>  /  AlkPhos  119  12-27    Creatinine Trend: 0.72<--        PHYSICAL EXAM:  T(C): 36.7 (12-27-23 @ 18:05), Max: 36.8 (12-27-23 @ 14:47)  HR: 87 (12-27-23 @ 18:05) (75 - 87)  BP: 125/94 (12-27-23 @ 18:05) (125/94 - 153/105)  RR: 16 (12-27-23 @ 15:52) (16 - 16)  SpO2: 98% (12-27-23 @ 15:52) (98% - 98%)  Wt(kg): --     I&O's Summary      General:  Alert and Oriented * 3.   Head: Normocephalic and atraumatic.   Neck: No JVD. No bruits. Supple. Does not appear to be enlarged.   Cardiovascular: + S1,S2 ; RRR Soft systolic murmur at the left lower sternal border. No rubs noted.    Lungs: CTA b/l. No rhonchi, rales or wheezes.   Abdomen: + BS, soft. Non tender. Non distended. No rebound. No guarding.   Extremities: No clubbing/cyanosis/edema.   Neurologic: Moves all four extremities. Full range of motion.   Skin: Warm and moist. The patient's skin has normal elasticity and good skin turgor.   Psychiatric: Appropriate mood and affect.  Musculoskeletal: Normal range of motion, normal strength     TELEMETRY: 	  NSR    ECG:  	NSR    Stress 06/2023  Normal myocardial perfusion SPECT images No evidence of stress induced ischemia or infarction.  Normal left ventricular size and function.  Calculated EF is: 65%     Holter 06/2023  Sinus rhythm.  Rare APCs and VPCs    TTE  1. Normal left ventricular size and function.  Mild diastolic dysfunction.  2. Normal left atrial size  3. Right atrial cavity is normal in size.  4. Normal right ventricular size and function.  5. Normal trileaflet aortic valve opening.  6. Normal mitral valve opening.  7. Normal appearing tricuspid valve with mild tricuspid  regurgitation.  8. Pulmonic valve is grossly normal, yet poorly visualized  with no doppler evidence for pulmonic stenosis.  9. No evidence of significant pericardial effusion.  10. The aortic root is normal.  11. Normal pulmonary artery.  12. IVC is normal with respiratory variation.  FULL STUDY DONE INCLUDING M-MODE RECORDING,  SPECTRAL DOPPLER AND COLOR FLOW  ECHOCARDIOGRAPHY      ASSESSMENT/PLAN: Pt is a 43M,  pmh of [facility], who is sent from office for central burning chest pain that can get worse with food. He also reports an exertional component and some SOB. 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 Pain is left sided, non radiating denies any palpitations, orthopnea, PND or recent fevers. HE does not smoke, denies any illicit drug use No belly pain, nvd, dysuria, hematuria, recent travel, trauma, syncope.    1. Chest Pain  - atypical with typical features  - trop negative, EKG non ischemic, recent negative stress where he exercised for almost 12 minus and had no perfusion defects on stress imaging  - check CCTA for atypical pain and risk factors  - start Metoprolol 25 q 6 hours to lower heart rate for CCTA  - start PPI  - c/w Amlodipine  - if CCTA negative will start NSAIDS for MSK pain  - check RUQ u/s    Maria Luisa Geller MD  Pager: [phone]

## 2023-12-27 NOTE — ED ADULT NURSE NOTE - NSFALLUNIVINTERV_ED_ALL_ED
Bed/Stretcher in lowest position, wheels locked, appropriate side rails in place/Call bell, personal items and telephone in reach/Instruct patient to call for assistance before getting out of bed/chair/stretcher/Non-slip footwear applied when patient is off stretcher/[place] to call system/Physically safe environment - no spills, clutter or unnecessary equipment/Purposeful proactive rounding/Room/bathroom lighting operational, light cord in reach Bed/Stretcher in lowest position, wheels locked, appropriate side rails in place/Call bell, personal items and telephone in reach/Instruct patient to call for assistance before getting out of bed/chair/stretcher/Non-slip footwear applied when patient is off stretcher/[place] to call system/Physically safe environment - no spills, clutter or unnecessary equipment/Purposeful proactive rounding/Room/bathroom lighting operational, light cord in reach

## 2023-12-28 DIAGNOSIS — E78.49 OTHER HYPERLIPIDEMIA: ICD-10-CM

## 2023-12-28 DIAGNOSIS — E11.65 TYPE 2 DIABETES MELLITUS WITH HYPERGLYCEMIA: ICD-10-CM

## 2023-12-28 DIAGNOSIS — K76.0 FATTY (CHANGE OF) LIVER, NOT ELSEWHERE CLASSIFIED: ICD-10-CM

## 2023-12-28 LAB
A1C WITH ESTIMATED AVERAGE GLUCOSE RESULT: 9.5 % — HIGH (ref 4–5.6)
A1C WITH ESTIMATED AVERAGE GLUCOSE RESULT: 9.5 % — HIGH (ref 4–5.6)
ALBUMIN SERPL ELPH-MCNC: 3.8 G/DL — SIGNIFICANT CHANGE UP (ref 3.3–5)
ALBUMIN SERPL ELPH-MCNC: 3.8 G/DL — SIGNIFICANT CHANGE UP (ref 3.3–5)
ALP SERPL-CCNC: 103 U/L — SIGNIFICANT CHANGE UP (ref 40–120)
ALP SERPL-CCNC: 103 U/L — SIGNIFICANT CHANGE UP (ref 40–120)
ALT FLD-CCNC: 104 U/L — HIGH (ref 4–41)
ALT FLD-CCNC: 104 U/L — HIGH (ref 4–41)
ANION GAP SERPL CALC-SCNC: 12 MMOL/L — SIGNIFICANT CHANGE UP (ref 7–14)
ANION GAP SERPL CALC-SCNC: 12 MMOL/L — SIGNIFICANT CHANGE UP (ref 7–14)
AST SERPL-CCNC: 63 U/L — HIGH (ref 4–40)
AST SERPL-CCNC: 63 U/L — HIGH (ref 4–40)
BILIRUB SERPL-MCNC: 0.4 MG/DL — SIGNIFICANT CHANGE UP (ref 0.2–1.2)
BILIRUB SERPL-MCNC: 0.4 MG/DL — SIGNIFICANT CHANGE UP (ref 0.2–1.2)
BUN SERPL-MCNC: 12 MG/DL — SIGNIFICANT CHANGE UP (ref 7–23)
BUN SERPL-MCNC: 12 MG/DL — SIGNIFICANT CHANGE UP (ref 7–23)
CALCIUM SERPL-MCNC: 8.8 MG/DL — SIGNIFICANT CHANGE UP (ref 8.4–10.5)
CALCIUM SERPL-MCNC: 8.8 MG/DL — SIGNIFICANT CHANGE UP (ref 8.4–10.5)
CHLORIDE SERPL-SCNC: 99 MMOL/L — SIGNIFICANT CHANGE UP (ref 98–107)
CHLORIDE SERPL-SCNC: 99 MMOL/L — SIGNIFICANT CHANGE UP (ref 98–107)
CHOLEST SERPL-MCNC: 240 MG/DL — HIGH
CHOLEST SERPL-MCNC: 240 MG/DL — HIGH
CO2 SERPL-SCNC: 26 MMOL/L — SIGNIFICANT CHANGE UP (ref 22–31)
CO2 SERPL-SCNC: 26 MMOL/L — SIGNIFICANT CHANGE UP (ref 22–31)
CREAT SERPL-MCNC: 0.74 MG/DL — SIGNIFICANT CHANGE UP (ref 0.5–1.3)
CREAT SERPL-MCNC: 0.74 MG/DL — SIGNIFICANT CHANGE UP (ref 0.5–1.3)
EGFR: 115 ML/MIN/1.73M2 — SIGNIFICANT CHANGE UP
EGFR: 115 ML/MIN/1.73M2 — SIGNIFICANT CHANGE UP
ESTIMATED AVERAGE GLUCOSE: 226 — SIGNIFICANT CHANGE UP
ESTIMATED AVERAGE GLUCOSE: 226 — SIGNIFICANT CHANGE UP
GLUCOSE BLDC GLUCOMTR-MCNC: 200 MG/DL — HIGH (ref 70–99)
GLUCOSE BLDC GLUCOMTR-MCNC: 200 MG/DL — HIGH (ref 70–99)
GLUCOSE BLDC GLUCOMTR-MCNC: 247 MG/DL — HIGH (ref 70–99)
GLUCOSE BLDC GLUCOMTR-MCNC: 247 MG/DL — HIGH (ref 70–99)
GLUCOSE BLDC GLUCOMTR-MCNC: 277 MG/DL — HIGH (ref 70–99)
GLUCOSE BLDC GLUCOMTR-MCNC: 277 MG/DL — HIGH (ref 70–99)
GLUCOSE BLDC GLUCOMTR-MCNC: 294 MG/DL — HIGH (ref 70–99)
GLUCOSE BLDC GLUCOMTR-MCNC: 294 MG/DL — HIGH (ref 70–99)
GLUCOSE SERPL-MCNC: 189 MG/DL — HIGH (ref 70–99)
GLUCOSE SERPL-MCNC: 189 MG/DL — HIGH (ref 70–99)
HDLC SERPL-MCNC: 35 MG/DL — LOW
HDLC SERPL-MCNC: 35 MG/DL — LOW
LIPID PNL WITH DIRECT LDL SERPL: 167 MG/DL — HIGH
LIPID PNL WITH DIRECT LDL SERPL: 167 MG/DL — HIGH
NON HDL CHOLESTEROL: 205 MG/DL — HIGH
NON HDL CHOLESTEROL: 205 MG/DL — HIGH
POTASSIUM SERPL-MCNC: 3.7 MMOL/L — SIGNIFICANT CHANGE UP (ref 3.5–5.3)
POTASSIUM SERPL-MCNC: 3.7 MMOL/L — SIGNIFICANT CHANGE UP (ref 3.5–5.3)
POTASSIUM SERPL-SCNC: 3.7 MMOL/L — SIGNIFICANT CHANGE UP (ref 3.5–5.3)
POTASSIUM SERPL-SCNC: 3.7 MMOL/L — SIGNIFICANT CHANGE UP (ref 3.5–5.3)
PROT SERPL-MCNC: 7.2 G/DL — SIGNIFICANT CHANGE UP (ref 6–8.3)
PROT SERPL-MCNC: 7.2 G/DL — SIGNIFICANT CHANGE UP (ref 6–8.3)
SODIUM SERPL-SCNC: 137 MMOL/L — SIGNIFICANT CHANGE UP (ref 135–145)
SODIUM SERPL-SCNC: 137 MMOL/L — SIGNIFICANT CHANGE UP (ref 135–145)
TRIGL SERPL-MCNC: 188 MG/DL — HIGH
TRIGL SERPL-MCNC: 188 MG/DL — HIGH

## 2023-12-28 PROCEDURE — 99222 1ST HOSP IP/OBS MODERATE 55: CPT

## 2023-12-28 PROCEDURE — 76705 ECHO EXAM OF ABDOMEN: CPT | Mod: 26

## 2023-12-28 PROCEDURE — 93306 TTE W/DOPPLER COMPLETE: CPT | Mod: 26

## 2023-12-28 RX ORDER — MORPHINE SULFATE 50 MG/1
2 CAPSULE, EXTENDED RELEASE ORAL EVERY 4 HOURS
Refills: 0 | Status: DISCONTINUED | OUTPATIENT
Start: 2023-12-28 | End: 2023-12-29

## 2023-12-28 RX ORDER — INSULIN LISPRO 100/ML
4 VIAL (ML) SUBCUTANEOUS
Refills: 0 | Status: DISCONTINUED | OUTPATIENT
Start: 2023-12-28 | End: 2023-12-29

## 2023-12-28 RX ORDER — INFLUENZA VIRUS VACCINE 15; 15; 15; 15 UG/.5ML; UG/.5ML; UG/.5ML; UG/.5ML
0.5 SUSPENSION INTRAMUSCULAR ONCE
Refills: 0 | Status: DISCONTINUED | OUTPATIENT
Start: 2023-12-28 | End: 2023-12-29

## 2023-12-28 RX ORDER — INSULIN GLARGINE 100 [IU]/ML
16 INJECTION, SOLUTION SUBCUTANEOUS AT BEDTIME
Refills: 0 | Status: DISCONTINUED | OUTPATIENT
Start: 2023-12-28 | End: 2023-12-29

## 2023-12-28 RX ORDER — KETOROLAC TROMETHAMINE 30 MG/ML
15 SYRINGE (ML) INJECTION ONCE
Refills: 0 | Status: DISCONTINUED | OUTPATIENT
Start: 2023-12-28 | End: 2023-12-28

## 2023-12-28 RX ORDER — SODIUM CHLORIDE 9 MG/ML
1000 INJECTION INTRAMUSCULAR; INTRAVENOUS; SUBCUTANEOUS
Refills: 0 | Status: DISCONTINUED | OUTPATIENT
Start: 2023-12-28 | End: 2023-12-29

## 2023-12-28 RX ORDER — ACETAMINOPHEN 500 MG
650 TABLET ORAL EVERY 6 HOURS
Refills: 0 | Status: DISCONTINUED | OUTPATIENT
Start: 2023-12-28 | End: 2023-12-29

## 2023-12-28 RX ADMIN — SODIUM CHLORIDE 80 MILLILITER(S): 9 INJECTION INTRAMUSCULAR; INTRAVENOUS; SUBCUTANEOUS at 22:13

## 2023-12-28 RX ADMIN — INSULIN GLARGINE 16 UNIT(S): 100 INJECTION, SOLUTION SUBCUTANEOUS at 22:11

## 2023-12-28 RX ADMIN — Medication 25 MILLIGRAM(S): at 11:53

## 2023-12-28 RX ADMIN — Medication 2: at 08:03

## 2023-12-28 RX ADMIN — MORPHINE SULFATE 2 MILLIGRAM(S): 50 CAPSULE, EXTENDED RELEASE ORAL at 23:45

## 2023-12-28 RX ADMIN — Medication 25 MILLIGRAM(S): at 05:58

## 2023-12-28 RX ADMIN — Medication 25 MILLIGRAM(S): at 23:46

## 2023-12-28 RX ADMIN — Medication 650 MILLIGRAM(S): at 19:35

## 2023-12-28 RX ADMIN — Medication 81 MILLIGRAM(S): at 11:53

## 2023-12-28 RX ADMIN — Medication 1: at 22:12

## 2023-12-28 RX ADMIN — PANTOPRAZOLE SODIUM 40 MILLIGRAM(S): 20 TABLET, DELAYED RELEASE ORAL at 05:59

## 2023-12-28 RX ADMIN — Medication 25 MILLIGRAM(S): at 17:54

## 2023-12-28 RX ADMIN — Medication 4: at 11:53

## 2023-12-28 RX ADMIN — Medication 15 MILLIGRAM(S): at 20:13

## 2023-12-28 RX ADMIN — Medication 15 MILLIGRAM(S): at 17:54

## 2023-12-28 NOTE — PROGRESS NOTE ADULT - ASSESSMENT
43M, preDM, ?HTN (no meds), who is sent from cardiology clinic for possible admission. For the past several months, reports L sided chest pain. Non-radiating. Non-smoker. Denies illicits. Denies hx of VTE. No leg swelling. Pain is becoming more frequent and thus he was sent from clinic ([facility]) for possible admission. No belly pain, nvd, dysuria, hematuria, recent travel, trauma, syncope.       Problem/Plan - 1:  ·  Problem: Chest pain.   ·  Plan: with [facility] .  Had recent Stress test .   Awaiting CT coronaries.   Cardiology following.     Problem/Plan - 2:  ·  Problem: DM (diabetes mellitus).   ·  Plan: Will start SSI as sugars high .   HgbA1c and lipid profile high so endo consulted. .     Problem/Plan - 3:  ·  Problem: HTN (hypertension).   ·  Plan: BP high so may need to start BP meds.   Will start low dose BB.     Problem/Plan - 4:  ·  Problem: Transaminitis.   ·  Plan: Likely secondary to Fatty liver .     Problem/Plan - 5:  ·  Problem: Hyperlipidemia .   ·  Plan: Diet /Exercise and will start Lipitor.  Trending LFT.  .

## 2023-12-28 NOTE — PATIENT PROFILE ADULT - FALL HARM RISK - HARM RISK INTERVENTIONS
Assistance OOB with selected safe patient handling equipment/Communicate Risk of Fall with Harm to all staff/Discuss with provider need for PT consult/Monitor gait and stability/Provide patient with walking aids - walker, cane, crutches/Reinforce activity limits and safety measures with patient and family/Tailored Fall Risk Interventions/Visual Cue: Yellow wristband and red socks/Bed in lowest position, wheels locked, appropriate side rails in place/Call bell, personal items and telephone in reach/Instruct patient to call for assistance before getting out of bed or chair/Non-slip footwear when patient is out of bed/[place] to call system/Physically safe environment - no spills, clutter or unnecessary equipment/Purposeful Proactive Rounding/Room/bathroom lighting operational, light cord in reach Assistance OOB with selected safe patient handling equipment/Communicate Risk of Fall with Harm to all staff/Discuss with provider need for PT consult/Monitor gait and stability/Provide patient with walking aids - walker, cane, crutches/Reinforce activity limits and safety measures with patient and family/Tailored Fall Risk Interventions/Visual Cue: Yellow wristband and red socks/Bed in lowest position, wheels locked, appropriate side rails in place/Call bell, personal items and telephone in reach/Instruct patient to call for assistance before getting out of bed or chair/Non-slip footwear when patient is out of bed/[place] to call system/Physically safe environment - no spills, clutter or unnecessary equipment/Purposeful Proactive Rounding/Room/bathroom lighting operational, light cord in reach

## 2023-12-28 NOTE — CONSULT NOTE ADULT - ASSESSMENT
43M newly diagnosed type 2 DM, also with transaminitis, fatty liver on abd US, HTN presenting with CP.    1) DM2  New diagnosis  HbA1c 9.5%  reviewed HBA1c meaning and goal < 7% moving forward to prevent DM complications    While inpatient:  BG target 100-180 mg/dl  Start Lantus 16 units qhs  Start Admelog 4/4/4 units premeal TID  Continue moderate Admelog mealtime scale and low bedtime scale  Change to carb consistent diet  Place RD consult  Provider to RN to teach glucometer    Discharge plan:  metformin 500mg BID with food, then after 1 week increase to 1000mg BID with food  stop daily sugary energy drink  overall improve diet and add exercise  stop daily vodka  Home glucose monitoring  prescribe glucometer, test strips, lancets, alcohol pads  Discussed benefits of GLP1 for DM2 and NAFLD, he may consider adding this as outpatient  opthalmology eval as outpatient    2) NAFLD  importance of weight loss, dietary improvements, stop alcohol  may add GLP1 as outpatient  trend LFTS    3) HTN  BP goal < 130/80  outpatient albumin/creat    4) Hyperlipidemia   - would benefit from statin depending on LFT elevation if acute vs chronic    Follow up outpatient with primary care physician and/or [facility] endocrinology [phone]    Chel Queen MD  Division of Endocrinology  Pager: [phone]    If after 6PM or before 9AM, or on weekends/holidays, please call endocrine answering service for assistance ([phone]).  For nonurgent matters email [email or web address] for assistance.       43M newly diagnosed type 2 DM, also with transaminitis, fatty liver on abd US, HTN presenting with CP.    1) DM2  New diagnosis  HbA1c 9.5%  reviewed HBA1c meaning and goal < 7% moving forward to prevent DM complications    While inpatient:  BG target 100-180 mg/dl  Start Lantus 16 units qhs  Start Admelog 4/4/4 units premeal TID  Continue moderate Admelog mealtime scale and low bedtime scale  Change to carb consistent diet  Place RD consult  Provider to RN to teach glucometer    Discharge plan:  metformin 500mg BID with food, then after 1 week increase to 1000mg BID with food  stop daily sugary energy drink  overall improve diet and add exercise  stop daily vodka  Home glucose monitoring  prescribe glucometer, test strips, lancets, alcohol pads  Discussed benefits of GLP1 for DM2 and NAFLD, he may consider adding this as outpatient  opthalmology eval as outpatient    2) NAFLD  importance of weight loss, dietary improvements, stop alcohol  may add GLP1 as outpatient  trend LFTS    3) HTN  BP goal < 130/80  outpatient albumin/creat    4) Hyperlipidemia   - would benefit from statin depending on LFT elevation if acute vs chronic    Follow up outpatient with primary care physician and/or [facility] endocrinology [phone]    Chel Queen MD  Division of Endocrinology  Pager: [phone]    If after 6PM or before 9AM, or on weekends/holidays, please call endocrine answering service for assistance ([phone]).  For nonurgent matters email [email or web address] for assistance.

## 2023-12-28 NOTE — PROGRESS NOTE ADULT - SUBJECTIVE AND OBJECTIVE BOX
DATE OF SERVICE: 12-28-23    Patient denies chest pain or shortness of breath.   Review of symptoms otherwise negative.    MEDICATIONS:  aspirin enteric coated 81 milliGRAM(s) Oral daily  dextrose 5%. 1000 milliLiter(s) IV Continuous <Continuous>  dextrose 5%. 1000 milliLiter(s) IV Continuous <Continuous>  dextrose 50% Injectable 12.5 Gram(s) IV Push once  dextrose 50% Injectable 25 Gram(s) IV Push once  dextrose 50% Injectable 25 Gram(s) IV Push once  dextrose Oral Gel 15 Gram(s) Oral once PRN  glucagon  Injectable 1 milliGRAM(s) IntraMuscular once  influenza   Vaccine 0.5 milliLiter(s) IntraMuscular once  insulin lispro (ADMELOG) corrective regimen sliding scale   SubCutaneous at bedtime  insulin lispro (ADMELOG) corrective regimen sliding scale   SubCutaneous three times a day before meals  metoprolol tartrate 25 milliGRAM(s) Oral every 6 hours  pantoprazole    Tablet 40 milliGRAM(s) Oral before breakfast      LABS:                        16.2   8.40  )-----------( 288      ( 27 Dec 2023 15:49 )             48.1       Hemoglobin: 16.2 g/dL (12-27 @ 15:49)      12-28    137  |  99  |  12  ----------------------------<  189<H>  3.7   |  26  |  0.74    Ca    8.8      28 Dec 2023 04:53    TPro  7.2  /  Alb  3.8  /  TBili  0.4  /  DBili  x   /  AST  63<H>  /  ALT  104<H>  /  AlkPhos  103  12-28    Creatinine Trend: 0.74<--, 0.72<--    COAGS:           PHYSICAL EXAM:  T(C): 36.6 (12-28-23 @ 05:55), Max: 36.9 (12-27-23 @ 21:17)  HR: 62 (12-28-23 @ 05:55) (62 - 87)  BP: 130/81 (12-28-23 @ 05:55) (125/86 - 153/105)  RR: 18 (12-28-23 @ 05:55) (16 - 18)  SpO2: 98% (12-28-23 @ 05:55) (97% - 98%)  Wt(kg): --    I&O's Summary      General:  Alert and Oriented * 3.   Head: Normocephalic and atraumatic.   Neck: No JVD. No bruits. Supple. Does not appear to be enlarged.   Cardiovascular: + S1,S2 ; RRR Soft systolic murmur at the left lower sternal border. No rubs noted.    Lungs: CTA b/l. No rhonchi, rales or wheezes.   Abdomen: + BS, soft. Non tender. Non distended. No rebound. No guarding.   Extremities: No clubbing/cyanosis/edema.   Neurologic: Moves all four extremities. Full range of motion.   Skin: Warm and moist. The patient's skin has normal elasticity and good skin turgor.   Psychiatric: Appropriate mood and affect.  Musculoskeletal: Normal range of motion, normal strength     TELEMETRY: 	  NSR    ECG:  	NSR    Stress 06/2023  Normal myocardial perfusion SPECT images No evidence of stress induced ischemia or infarction.  Normal left ventricular size and function.  Calculated EF is: 65%     Holter 06/2023  Sinus rhythm.  Rare APCs and VPCs    TTE  1. Normal left ventricular size and function.  Mild diastolic dysfunction.  2. Normal left atrial size  3. Right atrial cavity is normal in size.  4. Normal right ventricular size and function.  5. Normal trileaflet aortic valve opening.  6. Normal mitral valve opening.  7. Normal appearing tricuspid valve with mild tricuspid  regurgitation.  8. Pulmonic valve is grossly normal, yet poorly visualized  with no doppler evidence for pulmonic stenosis.  9. No evidence of significant pericardial effusion.  10. The aortic root is normal.  11. Normal pulmonary artery.  12. IVC is normal with respiratory variation.  FULL STUDY DONE INCLUDING M-MODE RECORDING,  SPECTRAL DOPPLER AND COLOR FLOW  ECHOCARDIOGRAPHY      ASSESSMENT/PLAN: Pt is a 43M,  pmh of [facility], who is sent from office for central burning chest pain that can get worse with food. He also reports an exertional component and some SOB. 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 Pain is left sided, non radiating denies any palpitations, orthopnea, PND or recent fevers. HE does not smoke, denies any illicit drug use No belly pain, nvd, dysuria, hematuria, recent travel, trauma, syncope.    1. Chest Pain  - atypical with typical features  - trop negative, EKG non ischemic, recent negative stress where he exercised for almost 12 minus and had no perfusion defects on stress imaging  - check CCTA for atypical pain and risk factors  - start Metoprolol 25 q 6 hours to lower heart rate for CCTA, current heart rate in  60s  - c/w PPI  - c/w Amlodipine  - if CCTA negative will start NSAIDS for MSK pain  - check RUQ u/s    Maria Luisa Geller MD  Pager: [phone]        DATE OF SERVICE: 12-28-23    Patient denies chest pain or shortness of breath.   Review of symptoms otherwise negative.    MEDICATIONS:  aspirin enteric coated 81 milliGRAM(s) Oral daily  dextrose 5%. 1000 milliLiter(s) IV Continuous <Continuous>  dextrose 5%. 1000 milliLiter(s) IV Continuous <Continuous>  dextrose 50% Injectable 12.5 Gram(s) IV Push once  dextrose 50% Injectable 25 Gram(s) IV Push once  dextrose 50% Injectable 25 Gram(s) IV Push once  dextrose Oral Gel 15 Gram(s) Oral once PRN  glucagon  Injectable 1 milliGRAM(s) IntraMuscular once  influenza   Vaccine 0.5 milliLiter(s) IntraMuscular once  insulin lispro (ADMELOG) corrective regimen sliding scale   SubCutaneous at bedtime  insulin lispro (ADMELOG) corrective regimen sliding scale   SubCutaneous three times a day before meals  metoprolol tartrate 25 milliGRAM(s) Oral every 6 hours  pantoprazole    Tablet 40 milliGRAM(s) Oral before breakfast      LABS:                        16.2   8.40  )-----------( 288      ( 27 Dec 2023 15:49 )             48.1       Hemoglobin: 16.2 g/dL (12-27 @ 15:49)      12-28    137  |  99  |  12  ----------------------------<  189<H>  3.7   |  26  |  0.74    Ca    8.8      28 Dec 2023 04:53    TPro  7.2  /  Alb  3.8  /  TBili  0.4  /  DBili  x   /  AST  63<H>  /  ALT  104<H>  /  AlkPhos  103  12-28    Creatinine Trend: 0.74<--, 0.72<--    COAGS:           PHYSICAL EXAM:  T(C): 36.6 (12-28-23 @ 05:55), Max: 36.9 (12-27-23 @ 21:17)  HR: 62 (12-28-23 @ 05:55) (62 - 87)  BP: 130/81 (12-28-23 @ 05:55) (125/86 - 153/105)  RR: 18 (12-28-23 @ 05:55) (16 - 18)  SpO2: 98% (12-28-23 @ 05:55) (97% - 98%)  Wt(kg): --    I&O's Summary      General:  Alert and Oriented * 3.   Head: Normocephalic and atraumatic.   Neck: No JVD. No bruits. Supple. Does not appear to be enlarged.   Cardiovascular: + S1,S2 ; RRR Soft systolic murmur at the left lower sternal border. No rubs noted.    Lungs: CTA b/l. No rhonchi, rales or wheezes.   Abdomen: + BS, soft. Non tender. Non distended. No rebound. No guarding.   Extremities: No clubbing/cyanosis/edema.   Neurologic: Moves all four extremities. Full range of motion.   Skin: Warm and moist. The patient's skin has normal elasticity and good skin turgor.   Psychiatric: Appropriate mood and affect.  Musculoskeletal: Normal range of motion, normal strength     TELEMETRY: 	  NSR    ECG:  	NSR    Stress 06/2023  Normal myocardial perfusion SPECT images No evidence of stress induced ischemia or infarction.  Normal left ventricular size and function.  Calculated EF is: 65%     Holter 06/2023  Sinus rhythm.  Rare APCs and VPCs    TTE  1. Normal left ventricular size and function.  Mild diastolic dysfunction.  2. Normal left atrial size  3. Right atrial cavity is normal in size.  4. Normal right ventricular size and function.  5. Normal trileaflet aortic valve opening.  6. Normal mitral valve opening.  7. Normal appearing tricuspid valve with mild tricuspid  regurgitation.  8. Pulmonic valve is grossly normal, yet poorly visualized  with no doppler evidence for pulmonic stenosis.  9. No evidence of significant pericardial effusion.  10. The aortic root is normal.  11. Normal pulmonary artery.  12. IVC is normal with respiratory variation.  FULL STUDY DONE INCLUDING M-MODE RECORDING,  SPECTRAL DOPPLER AND COLOR FLOW  ECHOCARDIOGRAPHY      ASSESSMENT/PLAN: Pt is a 43M,  pmh of [facility], who is sent from office for central burning chest pain that can get worse with food. He also reports an exertional component and some SOB. 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 Pain is left sided, non radiating denies any palpitations, orthopnea, PND or recent fevers. HE does not smoke, denies any illicit drug use No belly pain, nvd, dysuria, hematuria, recent travel, trauma, syncope.    1. Chest Pain  - atypical with typical features  - trop negative, EKG non ischemic, recent negative stress where he exercised for almost 12 minus and had no perfusion defects on stress imaging  - check CCTA for atypical pain and risk factors  - start Metoprolol 25 q 6 hours to lower heart rate for CCTA, current heart rate in  60s  - c/w PPI  - c/w Amlodipine  - if CCTA negative will start NSAIDS for MSK pain  - check RUQ u/s    Maria Luisa Geller MD  Pager: [phone]

## 2023-12-28 NOTE — CONSULT NOTE ADULT - SUBJECTIVE AND OBJECTIVE BOX
HPI:  43M, preDM, ?HTN (no meds), who is sent from cardiology clinic for possible admission. For the past several months, reports L sided chest pain. Non-radiating. Non-smoker. Denies illicits. Denies hx of VTE. No leg swelling. Pain is becoming more frequent and thus he was sent from clinic ([facility]) for possible admission. No belly pain, nvd, dysuria, hematuria, recent travel, trauma, syncope. (27 Dec 2023 18:05)      PAST MEDICAL & SURGICAL HISTORY:      FAMILY HISTORY:      Social History:        MEDICATIONS  (STANDING):  aspirin enteric coated 81 milliGRAM(s) Oral daily  dextrose 5%. 1000 milliLiter(s) (50 mL/Hr) IV Continuous <Continuous>  dextrose 5%. 1000 milliLiter(s) (100 mL/Hr) IV Continuous <Continuous>  dextrose 50% Injectable 12.5 Gram(s) IV Push once  dextrose 50% Injectable 25 Gram(s) IV Push once  dextrose 50% Injectable 25 Gram(s) IV Push once  glucagon  Injectable 1 milliGRAM(s) IntraMuscular once  influenza   Vaccine 0.5 milliLiter(s) IntraMuscular once  insulin lispro (ADMELOG) corrective regimen sliding scale   SubCutaneous at bedtime  insulin lispro (ADMELOG) corrective regimen sliding scale   SubCutaneous three times a day before meals  metoprolol tartrate 25 milliGRAM(s) Oral every 6 hours  pantoprazole    Tablet 40 milliGRAM(s) Oral before breakfast  sodium chloride 0.9%. 1000 milliLiter(s) (80 mL/Hr) IV Continuous <Continuous>    MEDICATIONS  (PRN):  acetaminophen     Tablet .. 650 milliGRAM(s) Oral every 6 hours PRN Temp greater or equal to 38C (100.4F), Mild Pain (1 - 3)  dextrose Oral Gel 15 Gram(s) Oral once PRN Blood Glucose LESS THAN 70 milliGRAM(s)/deciliter  morphine  - Injectable 2 milliGRAM(s) IV Push every 4 hours PRN Severe Pain (7 - 10)  oxycodone    5 mG/acetaminophen 325 mG 1 Tablet(s) Oral every 4 hours PRN Moderate Pain (4 - 6)      Allergies    No Known Allergies    Intolerances      Review of Systems:  Constitutional: No fever  Eyes: No blurry vision  Neuro: No tremors  HEENT: No pain  Cardiovascular: No chest pain, palpitations  Respiratory: No SOB, no cough  GI: No nausea, vomiting, abdominal pain  : No dysuria  Skin: no rash  Psych: no depression  Endocrine: no polyuria, polydipsia  Hem/lymph: no swelling  Osteoporosis: no fractures    ALL OTHER SYSTEMS REVIEWED AND NEGATIVE    UNABLE TO OBTAIN    PHYSICAL EXAM:  VITALS: T(C): 37.2 (12-28-23 @ 10:35)  T(F): 98.9 (12-28-23 @ 10:35), Max: 98.9 (12-28-23 @ 10:35)  HR: 85 (12-28-23 @ 10:35) (62 - 87)  BP: 112/71 (12-28-23 @ 10:35) (112/71 - 138/98)  RR:  (18 - 18)  SpO2:  (97% - 98%)  Wt(kg): --  GENERAL: NAD, well-groomed, well-developed  EYES: No proptosis, no lid lag, anicteric  HEENT:  Atraumatic, Normocephalic, moist mucous membranes  THYROID: Normal size, no palpable nodules  RESPIRATORY: Clear to auscultation bilaterally; No rales, rhonchi, wheezing  CARDIOVASCULAR: Regular rate and rhythm; No murmurs; no peripheral edema  GI: Soft, nontender, non distended, normal bowel sounds  SKIN: Dry, intact, No rashes or lesions  MUSCULOSKELETAL: Full range of motion, normal strength  NEURO: sensation intact, extraocular movements intact, no tremor  PSYCH: Alert and oriented x 3, normal affect, normal mood  CUSHING'S SIGNS: no striae      CAPILLARY BLOOD GLUCOSE      POCT Blood Glucose.: 277 mg/dL (28 Dec 2023 16:49)  POCT Blood Glucose.: 247 mg/dL (28 Dec 2023 11:13)  POCT Blood Glucose.: 200 mg/dL (28 Dec 2023 07:22)  POCT Blood Glucose.: 193 mg/dL (27 Dec 2023 22:49)  POCT Blood Glucose.: 212 mg/dL (27 Dec 2023 21:19)                            16.2   8.40  )-----------( 288      ( 27 Dec 2023 15:49 )             48.1       12-28    137  |  99  |  12  ----------------------------<  189<H>  3.7   |  26  |  0.74    eGFR: 115    Ca    8.8      12-28    TPro  7.2  /  Alb  3.8  /  TBili  0.4  /  DBili  x   /  AST  63<H>  /  ALT  104<H>  /  AlkPhos  103  12-28      Thyroid Function Tests:      A1C with Estimated Average Glucose Result: 9.5 % (12-28-23 @ 04:53)      12-28 Chol 240<H> Direct LDL -- LDL calculated 167<H> HDL 35<L> Trig 188<H>    Radiology:                HPI:  43M, preDM, ?HTN (no meds), who is sent from cardiology clinic for possible admission. For the past several months, reports L sided chest pain. Non-radiating. Non-smoker. Denies illicits. Denies hx of VTE. No leg swelling. Pain is becoming more frequent and thus he was sent from clinic ([facility]) for possible admission. No belly pain, nvd, dysuria, hematuria, recent travel, trauma, syncope.    Endocrine history:  43M found with HbA1c 9.5% - newly diagnosed DM  He reports being told of "borderline" DM in the past. Has not used DM meds or checked glucose.  Diet: drinks sugary energy drink every morning, roti, rice.  Drinks small amount of vodka daily after work.  Some blurry vision  Denies neuropathy      PAST MEDICAL & SURGICAL HISTORY:      FAMILY HISTORY: he is not sure if he has DM in his relatives      Social History: denies tobacco or drugs  drinks small amount of vodka daily after work        MEDICATIONS  (STANDING):  aspirin enteric coated 81 milliGRAM(s) Oral daily  dextrose 5%. 1000 milliLiter(s) (50 mL/Hr) IV Continuous <Continuous>  dextrose 5%. 1000 milliLiter(s) (100 mL/Hr) IV Continuous <Continuous>  dextrose 50% Injectable 12.5 Gram(s) IV Push once  dextrose 50% Injectable 25 Gram(s) IV Push once  dextrose 50% Injectable 25 Gram(s) IV Push once  glucagon  Injectable 1 milliGRAM(s) IntraMuscular once  influenza   Vaccine 0.5 milliLiter(s) IntraMuscular once  insulin lispro (ADMELOG) corrective regimen sliding scale   SubCutaneous at bedtime  insulin lispro (ADMELOG) corrective regimen sliding scale   SubCutaneous three times a day before meals  metoprolol tartrate 25 milliGRAM(s) Oral every 6 hours  pantoprazole    Tablet 40 milliGRAM(s) Oral before breakfast  sodium chloride 0.9%. 1000 milliLiter(s) (80 mL/Hr) IV Continuous <Continuous>    MEDICATIONS  (PRN):  acetaminophen     Tablet .. 650 milliGRAM(s) Oral every 6 hours PRN Temp greater or equal to 38C (100.4F), Mild Pain (1 - 3)  dextrose Oral Gel 15 Gram(s) Oral once PRN Blood Glucose LESS THAN 70 milliGRAM(s)/deciliter  morphine  - Injectable 2 milliGRAM(s) IV Push every 4 hours PRN Severe Pain (7 - 10)  oxycodone    5 mG/acetaminophen 325 mG 1 Tablet(s) Oral every 4 hours PRN Moderate Pain (4 - 6)      Allergies    No Known Allergies    Intolerances      Review of Systems:  Constitutional: No fever  Eyes: slight blurry vision  Neuro: No tremors  HEENT: No pain  Cardiovascular: CP on admission  Respiratory: No SOB, no cough  GI: No nausea, vomiting, abdominal pain  : No dysuria  Skin: no rash  Psych: no depression  Endocrine: no polyuria, polydipsia  Hem/lymph: no swelling  Osteoporosis: no fractures    ALL OTHER SYSTEMS REVIEWED AND NEGATIVE      PHYSICAL EXAM:  VITALS: T(C): 37.2 (12-28-23 @ 10:35)  T(F): 98.9 (12-28-23 @ 10:35), Max: 98.9 (12-28-23 @ 10:35)  HR: 85 (12-28-23 @ 10:35) (62 - 87)  BP: 112/71 (12-28-23 @ 10:35) (112/71 - 138/98)  RR:  (18 - 18)  SpO2:  (97% - 98%)  Wt(kg): --  GENERAL: NAD, well-groomed, well-developed  EYES: No proptosis, no lid lag, anicteric  HEENT:  Atraumatic, Normocephalic, moist mucous membranes  RESPIRATORY: Clear to auscultation bilaterally; No rales, rhonchi, wheezing  CARDIOVASCULAR: Regular rate and rhythm; No murmurs; no peripheral edema  GI: Soft, nontender, non distended  SKIN: Dry, intact, No rashes or lesions  MUSCULOSKELETAL: Full range of motion, normal strength  NEURO: sensation intact, extraocular movements intact, no tremor  PSYCH: Alert and oriented x 3, normal affect, normal mood      CAPILLARY BLOOD GLUCOSE      POCT Blood Glucose.: 277 mg/dL (28 Dec 2023 16:49)  POCT Blood Glucose.: 247 mg/dL (28 Dec 2023 11:13)  POCT Blood Glucose.: 200 mg/dL (28 Dec 2023 07:22)  POCT Blood Glucose.: 193 mg/dL (27 Dec 2023 22:49)  POCT Blood Glucose.: 212 mg/dL (27 Dec 2023 21:19)                            16.2   8.40  )-----------( 288      ( 27 Dec 2023 15:49 )             48.1       12-28    137  |  99  |  12  ----------------------------<  189<H>  3.7   |  26  |  0.74    eGFR: 115    Ca    8.8      12-28    TPro  7.2  /  Alb  3.8  /  TBili  0.4  /  DBili  x   /  AST  63<H>  /  ALT  104<H>  /  AlkPhos  103  12-28      Thyroid Function Tests:      A1C with Estimated Average Glucose Result: 9.5 % (12-28-23 @ 04:53)      12-28 Chol 240<H> Direct LDL -- LDL calculated 167<H> HDL 35<L> Trig 188<H>    Radiology:

## 2023-12-28 NOTE — CHART NOTE - NSCHARTNOTEFT_GEN_A_CORE
Notified by RN that pt was c/o abd pain.  Pt seen and examined bedside. Pt endorses 8/10 constant, sharp L sided abdominal pain.   States pain is similar to prior renal stone pain. Endorses dry mouth which he states happens when he has kidney stones. Denies dysuria, hematuria, nausea or vomiting.  Ordered CT a/p. Pt given Toradol x1 dose stat for pain. If needed can have PRN Tylenol for mild pain, percocet for moderate pain, and morphine for severe pain, ordered.   Discussed with Dr. Argueta. Notified by RN that pt was c/o abd pain.  Pt seen and examined bedside. Pt endorses 8/10 constant, sharp L sided abdominal pain.   States pain is similar to prior renal stone pain. Endorses dry mouth which he states happens when he has kidney stones. Denies dysuria, hematuria, nausea or vomiting.  Ordered CT a/p. Pt given Toradol x1 dose stat for pain. If needed can have PRN Tylenol for mild pain, percocet for moderate pain, and morphine for severe pain, ordered.   Started IVF.   Discussed with Dr. Argueta.

## 2023-12-28 NOTE — PROGRESS NOTE ADULT - SUBJECTIVE AND OBJECTIVE BOX
Date of Service  : 12-28-23     INTERVAL HPI/OVERNIGHT EVENTS: I am still having CP.   Vital Signs Last 24 Hrs  T(C): 36.6 (28 Dec 2023 05:55), Max: 36.9 (27 Dec 2023 21:17)  T(F): 97.8 (28 Dec 2023 05:55), Max: 98.5 (27 Dec 2023 21:17)  HR: 62 (28 Dec 2023 05:55) (62 - 87)  BP: 130/81 (28 Dec 2023 05:55) (125/86 - 153/105)  BP(mean): 104 (27 Dec 2023 18:05) (104 - 104)  RR: 18 (28 Dec 2023 05:55) (16 - 18)  SpO2: 98% (28 Dec 2023 05:55) (97% - 98%)    Parameters below as of 28 Dec 2023 05:55  Patient On (Oxygen Delivery Method): room air      I&O's Summary    MEDICATIONS  (STANDING):  aspirin enteric coated 81 milliGRAM(s) Oral daily  dextrose 5%. 1000 milliLiter(s) (50 mL/Hr) IV Continuous <Continuous>  dextrose 5%. 1000 milliLiter(s) (100 mL/Hr) IV Continuous <Continuous>  dextrose 50% Injectable 12.5 Gram(s) IV Push once  dextrose 50% Injectable 25 Gram(s) IV Push once  dextrose 50% Injectable 25 Gram(s) IV Push once  glucagon  Injectable 1 milliGRAM(s) IntraMuscular once  influenza   Vaccine 0.5 milliLiter(s) IntraMuscular once  insulin lispro (ADMELOG) corrective regimen sliding scale   SubCutaneous three times a day before meals  insulin lispro (ADMELOG) corrective regimen sliding scale   SubCutaneous at bedtime  metoprolol tartrate 25 milliGRAM(s) Oral every 6 hours  pantoprazole    Tablet 40 milliGRAM(s) Oral before breakfast    MEDICATIONS  (PRN):  dextrose Oral Gel 15 Gram(s) Oral once PRN Blood Glucose LESS THAN 70 milliGRAM(s)/deciliter    LABS:                        16.2   8.40  )-----------( 288      ( 27 Dec 2023 15:49 )             48.1     12-28    137  |  99  |  12  ----------------------------<  189<H>  3.7   |  26  |  0.74    Ca    8.8      28 Dec 2023 04:53    TPro  7.2  /  Alb  3.8  /  TBili  0.4  /  DBili  x   /  AST  63<H>  /  ALT  104<H>  /  AlkPhos  103  12-28      Urinalysis Basic - ( 28 Dec 2023 04:53 )    Color: x / Appearance: x / SG: x / pH: x  Gluc: 189 mg/dL / Ketone: x  / Bili: x / Urobili: x   Blood: x / Protein: x / Nitrite: x   Leuk Esterase: x / RBC: x / WBC x   Sq Epi: x / Non Sq Epi: x / Bacteria: x      CAPILLARY BLOOD GLUCOSE      POCT Blood Glucose.: 247 mg/dL (28 Dec 2023 11:13)  POCT Blood Glucose.: 200 mg/dL (28 Dec 2023 07:22)  POCT Blood Glucose.: 193 mg/dL (27 Dec 2023 22:49)  POCT Blood Glucose.: 212 mg/dL (27 Dec 2023 21:19)        Urinalysis Basic - ( 28 Dec 2023 04:53 )    Color: x / Appearance: x / SG: x / pH: x  Gluc: 189 mg/dL / Ketone: x  / Bili: x / Urobili: x   Blood: x / Protein: x / Nitrite: x   Leuk Esterase: x / RBC: x / WBC x   Sq Epi: x / Non Sq Epi: x / Bacteria: x      REVIEW OF SYSTEMS:  CONSTITUTIONAL: No fever, weight loss, or fatigue  EYES: No eye pain, visual disturbances, or discharge  ENMT:  No difficulty hearing, tinnitus, vertigo; No sinus or throat pain  NECK: No pain or stiffness  RESPIRATORY: No cough, wheezing, chills or hemoptysis; No shortness of breath  CARDIOVASCULAR: No chest pain, palpitations, dizziness, or leg swelling  GASTROINTESTINAL: No abdominal or epigastric pain. No nausea, vomiting, or hematemesis; No diarrhea or constipation. No melena or hematochezia.  GENITOURINARY: No dysuria, frequency, hematuria, or incontinence  NEUROLOGICAL: No headaches, memory loss, loss of strength, numbness, or tremors    RADIOLOGY & ADDITIONAL TESTS:    Consultant(s) Notes Reviewed:  [x ] YES  [ ] NO    PHYSICAL EXAM:  GENERAL: NAD, well-groomed, well-developed,not in any distress ,  HEAD:  Atraumatic, Normocephalic  EYES: EOMI, PERRLA, conjunctiva and sclera clear  ENMT: No tonsillar erythema, exudates, or enlargement; Moist mucous membranes, Good dentition, No lesions  NECK: Supple, No JVD, Normal thyroid  NERVOUS SYSTEM:  Alert & Oriented X3, No focal deficit   CHEST/LUNG: Good air entry bilateral with no  rales, rhonchi, wheezing, or rubs  HEART: Regular rate and rhythm; No murmurs, rubs, or gallops  ABDOMEN: Soft, Nontender, Nondistended; Bowel sounds present  EXTREMITIES:  2+ Peripheral Pulses, No clubbing, cyanosis, or edema      Care Discussed with Consultants/Other Providers [ x] YES  [ ] NO

## 2023-12-29 ENCOUNTER — TRANSCRIPTION ENCOUNTER (OUTPATIENT)
Age: 43
End: 2023-12-29

## 2023-12-29 VITALS
TEMPERATURE: 97 F | OXYGEN SATURATION: 99 % | DIASTOLIC BLOOD PRESSURE: 88 MMHG | HEART RATE: 63 BPM | RESPIRATION RATE: 18 BRPM | SYSTOLIC BLOOD PRESSURE: 140 MMHG

## 2023-12-29 LAB
ANION GAP SERPL CALC-SCNC: 11 MMOL/L — SIGNIFICANT CHANGE UP (ref 7–14)
ANION GAP SERPL CALC-SCNC: 11 MMOL/L — SIGNIFICANT CHANGE UP (ref 7–14)
BUN SERPL-MCNC: 14 MG/DL — SIGNIFICANT CHANGE UP (ref 7–23)
BUN SERPL-MCNC: 14 MG/DL — SIGNIFICANT CHANGE UP (ref 7–23)
CALCIUM SERPL-MCNC: 8.4 MG/DL — SIGNIFICANT CHANGE UP (ref 8.4–10.5)
CALCIUM SERPL-MCNC: 8.4 MG/DL — SIGNIFICANT CHANGE UP (ref 8.4–10.5)
CHLORIDE SERPL-SCNC: 100 MMOL/L — SIGNIFICANT CHANGE UP (ref 98–107)
CHLORIDE SERPL-SCNC: 100 MMOL/L — SIGNIFICANT CHANGE UP (ref 98–107)
CO2 SERPL-SCNC: 24 MMOL/L — SIGNIFICANT CHANGE UP (ref 22–31)
CO2 SERPL-SCNC: 24 MMOL/L — SIGNIFICANT CHANGE UP (ref 22–31)
CREAT SERPL-MCNC: 0.82 MG/DL — SIGNIFICANT CHANGE UP (ref 0.5–1.3)
CREAT SERPL-MCNC: 0.82 MG/DL — SIGNIFICANT CHANGE UP (ref 0.5–1.3)
EGFR: 112 ML/MIN/1.73M2 — SIGNIFICANT CHANGE UP
EGFR: 112 ML/MIN/1.73M2 — SIGNIFICANT CHANGE UP
GLUCOSE BLDC GLUCOMTR-MCNC: 121 MG/DL — HIGH (ref 70–99)
GLUCOSE BLDC GLUCOMTR-MCNC: 121 MG/DL — HIGH (ref 70–99)
GLUCOSE BLDC GLUCOMTR-MCNC: 151 MG/DL — HIGH (ref 70–99)
GLUCOSE BLDC GLUCOMTR-MCNC: 151 MG/DL — HIGH (ref 70–99)
GLUCOSE BLDC GLUCOMTR-MCNC: 165 MG/DL — HIGH (ref 70–99)
GLUCOSE BLDC GLUCOMTR-MCNC: 165 MG/DL — HIGH (ref 70–99)
GLUCOSE SERPL-MCNC: 177 MG/DL — HIGH (ref 70–99)
GLUCOSE SERPL-MCNC: 177 MG/DL — HIGH (ref 70–99)
HCT VFR BLD CALC: 46.6 % — SIGNIFICANT CHANGE UP (ref 39–50)
HCT VFR BLD CALC: 46.6 % — SIGNIFICANT CHANGE UP (ref 39–50)
HGB BLD-MCNC: 15.6 G/DL — SIGNIFICANT CHANGE UP (ref 13–17)
HGB BLD-MCNC: 15.6 G/DL — SIGNIFICANT CHANGE UP (ref 13–17)
MAGNESIUM SERPL-MCNC: 2 MG/DL — SIGNIFICANT CHANGE UP (ref 1.6–2.6)
MAGNESIUM SERPL-MCNC: 2 MG/DL — SIGNIFICANT CHANGE UP (ref 1.6–2.6)
MCHC RBC-ENTMCNC: 27.2 PG — SIGNIFICANT CHANGE UP (ref 27–34)
MCHC RBC-ENTMCNC: 27.2 PG — SIGNIFICANT CHANGE UP (ref 27–34)
MCHC RBC-ENTMCNC: 33.5 GM/DL — SIGNIFICANT CHANGE UP (ref 32–36)
MCHC RBC-ENTMCNC: 33.5 GM/DL — SIGNIFICANT CHANGE UP (ref 32–36)
MCV RBC AUTO: 81.3 FL — SIGNIFICANT CHANGE UP (ref 80–100)
MCV RBC AUTO: 81.3 FL — SIGNIFICANT CHANGE UP (ref 80–100)
NRBC # BLD: 0 /100 WBCS — SIGNIFICANT CHANGE UP (ref 0–0)
NRBC # BLD: 0 /100 WBCS — SIGNIFICANT CHANGE UP (ref 0–0)
NRBC # FLD: 0 K/UL — SIGNIFICANT CHANGE UP (ref 0–0)
NRBC # FLD: 0 K/UL — SIGNIFICANT CHANGE UP (ref 0–0)
PHOSPHATE SERPL-MCNC: 3.6 MG/DL — SIGNIFICANT CHANGE UP (ref 2.5–4.5)
PHOSPHATE SERPL-MCNC: 3.6 MG/DL — SIGNIFICANT CHANGE UP (ref 2.5–4.5)
PLATELET # BLD AUTO: 257 K/UL — SIGNIFICANT CHANGE UP (ref 150–400)
PLATELET # BLD AUTO: 257 K/UL — SIGNIFICANT CHANGE UP (ref 150–400)
POTASSIUM SERPL-MCNC: 3.8 MMOL/L — SIGNIFICANT CHANGE UP (ref 3.5–5.3)
POTASSIUM SERPL-MCNC: 3.8 MMOL/L — SIGNIFICANT CHANGE UP (ref 3.5–5.3)
POTASSIUM SERPL-SCNC: 3.8 MMOL/L — SIGNIFICANT CHANGE UP (ref 3.5–5.3)
POTASSIUM SERPL-SCNC: 3.8 MMOL/L — SIGNIFICANT CHANGE UP (ref 3.5–5.3)
RBC # BLD: 5.73 M/UL — SIGNIFICANT CHANGE UP (ref 4.2–5.8)
RBC # BLD: 5.73 M/UL — SIGNIFICANT CHANGE UP (ref 4.2–5.8)
RBC # FLD: 12 % — SIGNIFICANT CHANGE UP (ref 10.3–14.5)
RBC # FLD: 12 % — SIGNIFICANT CHANGE UP (ref 10.3–14.5)
SODIUM SERPL-SCNC: 135 MMOL/L — SIGNIFICANT CHANGE UP (ref 135–145)
SODIUM SERPL-SCNC: 135 MMOL/L — SIGNIFICANT CHANGE UP (ref 135–145)
WBC # BLD: 10.97 K/UL — HIGH (ref 3.8–10.5)
WBC # BLD: 10.97 K/UL — HIGH (ref 3.8–10.5)
WBC # FLD AUTO: 10.97 K/UL — HIGH (ref 3.8–10.5)
WBC # FLD AUTO: 10.97 K/UL — HIGH (ref 3.8–10.5)

## 2023-12-29 PROCEDURE — 99232 SBSQ HOSP IP/OBS MODERATE 35: CPT

## 2023-12-29 PROCEDURE — 75574 CT ANGIO HRT W/3D IMAGE: CPT | Mod: 26

## 2023-12-29 PROCEDURE — 74176 CT ABD & PELVIS W/O CONTRAST: CPT | Mod: 26

## 2023-12-29 RX ORDER — ISOPROPYL ALCOHOL, BENZOCAINE .7; .06 ML/ML; ML/ML
0 SWAB TOPICAL
Qty: 100 | Refills: 1
Start: 2023-12-29

## 2023-12-29 RX ORDER — OXYCODONE AND ACETAMINOPHEN 5; 325 MG/1; MG/1
1 TABLET ORAL
Qty: 18 | Refills: 0
Start: 2023-12-29 | End: 2023-12-31

## 2023-12-29 RX ORDER — PANTOPRAZOLE SODIUM 20 MG/1
1 TABLET, DELAYED RELEASE ORAL
Qty: 30 | Refills: 0
Start: 2023-12-29 | End: 2024-01-27

## 2023-12-29 RX ORDER — ATORVASTATIN CALCIUM 80 MG/1
1 TABLET, FILM COATED ORAL
Qty: 30 | Refills: 0
Start: 2023-12-29 | End: 2024-01-27

## 2023-12-29 RX ORDER — TAMSULOSIN HYDROCHLORIDE 0.4 MG/1
1 CAPSULE ORAL
Qty: 30 | Refills: 0
Start: 2023-12-29 | End: 2024-01-27

## 2023-12-29 RX ORDER — KETOROLAC TROMETHAMINE 30 MG/ML
15 SYRINGE (ML) INJECTION ONCE
Refills: 0 | Status: DISCONTINUED | OUTPATIENT
Start: 2023-12-29 | End: 2023-12-29

## 2023-12-29 RX ORDER — ASPIRIN/CALCIUM CARB/MAGNESIUM 324 MG
1 TABLET ORAL
Qty: 30 | Refills: 0
Start: 2023-12-29 | End: 2024-01-27

## 2023-12-29 RX ORDER — TAMSULOSIN HYDROCHLORIDE 0.4 MG/1
0.4 CAPSULE ORAL AT BEDTIME
Refills: 0 | Status: DISCONTINUED | OUTPATIENT
Start: 2023-12-29 | End: 2023-12-29

## 2023-12-29 RX ADMIN — Medication 4 UNIT(S): at 12:50

## 2023-12-29 RX ADMIN — PANTOPRAZOLE SODIUM 40 MILLIGRAM(S): 20 TABLET, DELAYED RELEASE ORAL at 06:15

## 2023-12-29 RX ADMIN — Medication 2: at 08:02

## 2023-12-29 RX ADMIN — Medication 2: at 12:49

## 2023-12-29 RX ADMIN — Medication 25 MILLIGRAM(S): at 12:50

## 2023-12-29 RX ADMIN — MORPHINE SULFATE 2 MILLIGRAM(S): 50 CAPSULE, EXTENDED RELEASE ORAL at 00:15

## 2023-12-29 RX ADMIN — Medication 25 MILLIGRAM(S): at 06:15

## 2023-12-29 RX ADMIN — Medication 15 MILLIGRAM(S): at 16:33

## 2023-12-29 RX ADMIN — Medication 15 MILLIGRAM(S): at 16:50

## 2023-12-29 RX ADMIN — Medication 4 UNIT(S): at 08:02

## 2023-12-29 RX ADMIN — Medication 81 MILLIGRAM(S): at 12:51

## 2023-12-29 NOTE — DISCHARGE NOTE NURSING/CASE MANAGEMENT/SOCIAL WORK - NSDCPEFALRISK_GEN_ALL_CORE
For information on Fall & Injury Prevention, visit: [email or web address] OR  [email or web address] OR  https://www.cdc.gov/steadi/patient.html For information on Fall & Injury Prevention, visit: [email or web address] OR  [email or web address] OR  https://www.cdc.gov/steadi/patient.html

## 2023-12-29 NOTE — DISCHARGE NOTE PROVIDER - DID THE PATIENT PRESENT WITH OR WAS TREATED FOR MALNUTRITION DURING THIS ADMISSION
Message   Recorded as Task   Date: 10/16/2017 04:29 PM, Created By: Maureen Bolanos   Task Name: Miscellaneous   Assigned To: Maureen Bolanos   Regarding Patient: DANNY LAN, Status: Active   Comment:    Maureen Bolanos - 16 Oct 2017 4:29 PM     TASK CREATED  Pt. missed new pt appointment today with Dr. Michele.  I sent pt a missed appointment letter.   Maureen Bolanos - 27 Dec 2017 10:56 AM     TASK EDITED  I spoke with pt he would like to come in for new pt appt.  He doesn't have his calander and he will call back to schedule new pt appt.        Signatures   Electronically signed by : Maureen Bolanos, ; Godwin  3 2018 11:03AM CST     No

## 2023-12-29 NOTE — DISCHARGE NOTE PROVIDER - NSFOLLOWUPCLINICS_GEN_ALL_ED_FT
[facility]  Urology  [address] [place] [address]  Phone: [phone]  Fax:     [facility]  Endocrinology  [address] [place] [address]  Phone: [phone]  Fax:      [facility]  Urology  [address] [place] [address]  Phone: [phone]  Fax:     [facility]  Endocrinology  [address] [place] [address]  Phone: [phone]  Fax:

## 2023-12-29 NOTE — DIETITIAN INITIAL EVALUATION ADULT - PERSON TAUGHT/METHOD
Onset: Yesterday  Location / description: Big bruise on her left leg at the outside of her knee   Precipitating Factors:Was playing basketball with son and she tripped and fell.  This is the knee that she tore her miniscus and feels it might be torn again.    Pain Scale (1 - 10), 10 highest: 4  Associated Symptoms: There is a large bruise.  Pt is on Coumadin.  It has not gotten any larger than it was yesterday.    What improves/worsens symptoms: She has been taking Tylenol.  She has been on a reduction program of her opoid meds.    Symptom specific medications: See EPIC  LMP : No LMP recorded.  Are you pregnant or breast feeding: No   Recent Care:09/22/17  Attempted to make an appt for Connie with Dr. Gonzales but he is booked until Thurs.  Pt refuses an appt with one of his assoc.  [place] [facility]       Reason for Disposition  • [1] MODERATE pain (e.g., interferes with normal activities, limping) AND [2] present > 3 days    Protocols used: KNEE PAIN-A-AH      
Regarding: leg is swollen and bruised  ----- Message from Lisset Howard sent at 11/27/2017  6:11 PM CST -----  Patient Name: Connie Harrell  Specialist or PCP:Dr Gonzales  Pregnant (If Yes, how long?):no  Symptoms:leg is swollen and bruised  Call Back #:[phone]  Is the patient’s permanent residence located in [place], [place], or a [facility]? Yes [place] [address]  Call Center Account #:[id number]      
verbal instruction/written material/patient instructed

## 2023-12-29 NOTE — DIETITIAN INITIAL EVALUATION ADULT - OTHER INFO
Patient is currently ordered for a PO diet. Patient reports a fair appetite and PO intake at meals during course of admission. Reports dislike of food provided, however deferred writer's offer to document food preferences. Patient encouraged to complete daily menu selections to help promote PO intake. Patient denies any chewing or swallowing difficulty with current diet order. No report of GI distress (nausea, vomiting, diarrhea, constipation). Last BM 12/27/23 per RN flowsheet documentation. Not noted to be on a bowel regimen.

## 2023-12-29 NOTE — PROGRESS NOTE ADULT - SUBJECTIVE AND OBJECTIVE BOX
Date of Service  : 12-29-23     INTERVAL HPI/OVERNIGHT EVENTS: I feel fine. I want to go home before 3 PM.   Vital Signs Last 24 Hrs  T(C): 36.3 (29 Dec 2023 06:10), Max: 37.2 (28 Dec 2023 10:35)  T(F): 97.4 (29 Dec 2023 06:10), Max: 98.9 (28 Dec 2023 10:35)  HR: 60 (29 Dec 2023 06:10) (60 - 85)  BP: 114/95 (29 Dec 2023 06:10) (112/71 - 146/98)  BP(mean): --  RR: 18 (29 Dec 2023 06:10) (17 - 18)  SpO2: 98% (29 Dec 2023 06:10) (98% - 99%)    Parameters below as of 29 Dec 2023 06:10  Patient On (Oxygen Delivery Method): room air      I&O's Summary    MEDICATIONS  (STANDING):  aspirin enteric coated 81 milliGRAM(s) Oral daily  dextrose 5%. 1000 milliLiter(s) (50 mL/Hr) IV Continuous <Continuous>  dextrose 5%. 1000 milliLiter(s) (100 mL/Hr) IV Continuous <Continuous>  dextrose 50% Injectable 12.5 Gram(s) IV Push once  dextrose 50% Injectable 25 Gram(s) IV Push once  dextrose 50% Injectable 25 Gram(s) IV Push once  glucagon  Injectable 1 milliGRAM(s) IntraMuscular once  influenza   Vaccine 0.5 milliLiter(s) IntraMuscular once  insulin glargine Injectable (LANTUS) 16 Unit(s) SubCutaneous at bedtime  insulin lispro (ADMELOG) corrective regimen sliding scale   SubCutaneous at bedtime  insulin lispro (ADMELOG) corrective regimen sliding scale   SubCutaneous three times a day before meals  insulin lispro Injectable (ADMELOG) 4 Unit(s) SubCutaneous three times a day before meals  metoprolol tartrate 25 milliGRAM(s) Oral every 6 hours  pantoprazole    Tablet 40 milliGRAM(s) Oral before breakfast  sodium chloride 0.9%. 1000 milliLiter(s) (80 mL/Hr) IV Continuous <Continuous>    MEDICATIONS  (PRN):  acetaminophen     Tablet .. 650 milliGRAM(s) Oral every 6 hours PRN Temp greater or equal to 38C (100.4F), Mild Pain (1 - 3)  dextrose Oral Gel 15 Gram(s) Oral once PRN Blood Glucose LESS THAN 70 milliGRAM(s)/deciliter  morphine  - Injectable 2 milliGRAM(s) IV Push every 4 hours PRN Severe Pain (7 - 10)  oxycodone    5 mG/acetaminophen 325 mG 1 Tablet(s) Oral every 4 hours PRN Moderate Pain (4 - 6)    LABS:                        15.6   10.97 )-----------( 257      ( 29 Dec 2023 05:33 )             46.6     12-29    135  |  100  |  14  ----------------------------<  177<H>  3.8   |  24  |  0.82    Ca    8.4      29 Dec 2023 05:33  Phos  3.6     12-29  Mg     2.00     12-29    TPro  7.2  /  Alb  3.8  /  TBili  0.4  /  DBili  x   /  AST  63<H>  /  ALT  104<H>  /  AlkPhos  103  12-28      Urinalysis Basic - ( 29 Dec 2023 05:33 )    Color: x / Appearance: x / SG: x / pH: x  Gluc: 177 mg/dL / Ketone: x  / Bili: x / Urobili: x   Blood: x / Protein: x / Nitrite: x   Leuk Esterase: x / RBC: x / WBC x   Sq Epi: x / Non Sq Epi: x / Bacteria: x      CAPILLARY BLOOD GLUCOSE      POCT Blood Glucose.: 151 mg/dL (29 Dec 2023 07:34)  POCT Blood Glucose.: 294 mg/dL (28 Dec 2023 21:20)  POCT Blood Glucose.: 277 mg/dL (28 Dec 2023 16:49)  POCT Blood Glucose.: 247 mg/dL (28 Dec 2023 11:13)        Urinalysis Basic - ( 29 Dec 2023 05:33 )    Color: x / Appearance: x / SG: x / pH: x  Gluc: 177 mg/dL / Ketone: x  / Bili: x / Urobili: x   Blood: x / Protein: x / Nitrite: x   Leuk Esterase: x / RBC: x / WBC x   Sq Epi: x / Non Sq Epi: x / Bacteria: x      REVIEW OF SYSTEMS:  CONSTITUTIONAL: No fever, weight loss, or fatigue  EYES: No eye pain, visual disturbances, or discharge  ENMT:  No difficulty hearing, tinnitus, vertigo; No sinus or throat pain  NECK: No pain or stiffness  RESPIRATORY: No cough, wheezing, chills or hemoptysis; No shortness of breath  CARDIOVASCULAR: No chest pain, palpitations, dizziness, or leg swelling  GASTROINTESTINAL: No abdominal or epigastric pain. No nausea, vomiting, or hematemesis; No diarrhea or constipation. No melena or hematochezia.  GENITOURINARY: No dysuria, frequency, hematuria, or incontinence  NEUROLOGICAL: No headaches, memory loss, loss of strength, numbness, or tremors      RADIOLOGY & ADDITIONAL TESTS:    Consultant(s) Notes Reviewed:  [x ] YES  [ ] NO    PHYSICAL EXAM:  GENERAL: NAD, well-groomed, well-developed,not in any distress ,  HEAD:  Atraumatic, Normocephalic  EYES: EOMI, PERRLA, conjunctiva and sclera clear  ENMT: No tonsillar erythema, exudates, or enlargement; Moist mucous membranes, Good dentition, No lesions  NECK: Supple, No JVD, Normal thyroid  NERVOUS SYSTEM:  Alert & Oriented X3, No focal deficit   CHEST/LUNG: Good air entry bilateral with no  rales, rhonchi, wheezing, or rubs  HEART: Regular rate and rhythm; No murmurs, rubs, or gallops  ABDOMEN: Soft, Nontender, Nondistended; Bowel sounds present  EXTREMITIES:  2+ Peripheral Pulses, No clubbing, cyanosis, or edema    Care Discussed with Consultants/Other Providers [ x] YES  [ ] NO

## 2023-12-29 NOTE — PROGRESS NOTE ADULT - SUBJECTIVE AND OBJECTIVE BOX
Chief Complaint: DM 2    History: Saw patient at bedtime today, denies complaints. Agreeable to Metformin for discharge.     MEDICATIONS  (STANDING):  aspirin enteric coated 81 milliGRAM(s) Oral daily  dextrose 5%. 1000 milliLiter(s) (100 mL/Hr) IV Continuous <Continuous>  dextrose 5%. 1000 milliLiter(s) (50 mL/Hr) IV Continuous <Continuous>  dextrose 50% Injectable 12.5 Gram(s) IV Push once  dextrose 50% Injectable 25 Gram(s) IV Push once  dextrose 50% Injectable 25 Gram(s) IV Push once  glucagon  Injectable 1 milliGRAM(s) IntraMuscular once  influenza   Vaccine 0.5 milliLiter(s) IntraMuscular once  insulin glargine Injectable (LANTUS) 16 Unit(s) SubCutaneous at bedtime  insulin lispro (ADMELOG) corrective regimen sliding scale   SubCutaneous at bedtime  insulin lispro (ADMELOG) corrective regimen sliding scale   SubCutaneous three times a day before meals  insulin lispro Injectable (ADMELOG) 4 Unit(s) SubCutaneous three times a day before meals  metoprolol tartrate 25 milliGRAM(s) Oral every 6 hours  pantoprazole    Tablet 40 milliGRAM(s) Oral before breakfast  sodium chloride 0.9%. 1000 milliLiter(s) (80 mL/Hr) IV Continuous <Continuous>  tamsulosin 0.4 milliGRAM(s) Oral at bedtime    MEDICATIONS  (PRN):  acetaminophen     Tablet .. 650 milliGRAM(s) Oral every 6 hours PRN Temp greater or equal to 38C (100.4F), Mild Pain (1 - 3)  dextrose Oral Gel 15 Gram(s) Oral once PRN Blood Glucose LESS THAN 70 milliGRAM(s)/deciliter  morphine  - Injectable 2 milliGRAM(s) IV Push every 4 hours PRN Severe Pain (7 - 10)  oxycodone    5 mG/acetaminophen 325 mG 1 Tablet(s) Oral every 4 hours PRN Moderate Pain (4 - 6)      Allergies    No Known Allergies    Intolerances      Review of Systems:  Constitutional: No fever  Eyes: No blurry vision  Neuro: No tremors  HEENT: No pain  Cardiovascular: No chest pain, palpitations  Respiratory: No SOB, no cough  GI: No nausea, vomiting, abdominal pain  : No dysuria  Skin: no rash  Psych: no depression  Endocrine: no polyuria, polydipsia  Hem/lymph: no swelling  Osteoporosis: no fractures    ALL OTHER SYSTEMS REVIEWED AND NEGATIVE    UNABLE TO OBTAIN    PHYSICAL EXAM:  VITALS: T(C): 36.8 (12-29-23 @ 09:50)  T(F): 98.2 (12-29-23 @ 09:50), Max: 98.8 (12-28-23 @ 18:05)  HR: 70 (12-29-23 @ 09:50) (60 - 76)  BP: 128/84 (12-29-23 @ 09:50) (114/95 - 146/98)  RR:  (17 - 18)  SpO2:  (96% - 99%)  Wt(kg): --  GENERAL: NAD, well-groomed, well-developed  EYES: No proptosis, no lid lag, anicteric  HEENT:  Atraumatic, Normocephalic, moist mucous membranes  THYROID: Normal size, no palpable nodules  RESPIRATORY: Clear to auscultation bilaterally; No rales, rhonchi, wheezing  CARDIOVASCULAR: Regular rate and rhythm; No murmurs; no peripheral edema  GI: Soft, nontender, non distended  SKIN: Dry, intact, No rashes or lesions  MUSCULOSKELETAL: Full range of motion, normal strength  NEURO: extraocular movements intact, no tremor  PSYCH: Alert and oriented x 3, normal affect, normal mood      CAPILLARY BLOOD GLUCOSE      POCT Blood Glucose.: 165 mg/dL (29 Dec 2023 12:20)  POCT Blood Glucose.: 151 mg/dL (29 Dec 2023 07:34)  POCT Blood Glucose.: 294 mg/dL (28 Dec 2023 21:20)  POCT Blood Glucose.: 277 mg/dL (28 Dec 2023 16:49)      12-29    135  |  100  |  14  ----------------------------<  177<H>  3.8   |  24  |  0.82    eGFR: 112    Ca    8.4      12-29  Mg     2.00     12-29  Phos  3.6     12-29    TPro  7.2  /  Alb  3.8  /  TBili  0.4  /  DBili  x   /  AST  63<H>  /  ALT  104<H>  /  AlkPhos  103  12-28          Thyroid Function Tests:        A1C with Estimated Average Glucose Result: 9.5 % (12-28-23 @ 04:53)          Diet, Consistent Carbohydrate w/Evening Snack (12-27-23 @ 18:17)

## 2023-12-29 NOTE — PROGRESS NOTE ADULT - ASSESSMENT
43M, preDM, ?HTN (no meds), who is sent from cardiology clinic for possible admission. For the past several months, reports L sided chest pain. Non-radiating. Non-smoker. Denies illicits. Denies hx of VTE. No leg swelling. Pain is becoming more frequent and thus he was sent from clinic ([facility]) for possible admission. No belly pain, nvd, dysuria, hematuria, recent travel, trauma, syncope.       Problem/Plan - 1:  ·  Problem: Chest pain.   ·  Plan: with BHARDWAJ .  Had recent Stress test .   Awaiting CT coronaries.   Cardiology following.     Problem/Plan - 2:  ·  Problem: DM (diabetes mellitus).   ·  Plan: Will start SSI as sugars high .   HgbA1c and lipid profile high so endo helping.      Problem/Plan - 3:  ·  Problem: HTN (hypertension).   ·  Plan: BP high so may need to start BP meds.   Will start low dose BB.     Problem/Plan - 4:  ·  Problem: Transaminitis.   ·  Plan: Likely secondary to Fatty liver .     Problem/Plan - 5:  ·  Problem: Hyperlipidemia .   ·  Plan: Diet /Exercise and will start Lipitor.  Trending LFT.  .     Problem/Plan - 6:  ·  Problem:  Ureteric stone with hydronephrosis  .   ·  Plan: Awaiting Urology consult .  < from: CT Abdomen and Pelvis No Cont (12.29.23 @ 08:37) >  IMPRESSION:  Obstructing 5 mm calculus in the left UVJ with mild left   hydroureteronephrosis.

## 2023-12-29 NOTE — DIETITIAN INITIAL EVALUATION ADULT - ORAL INTAKE PTA/DIET HISTORY
Patient reports no known food allergies or food intolerances. Nutrition supplementation at home includes iron and vitamin C. Patient denies any chewing or swallowing difficulty with regular solids or thin liquids. Patient does not consume beef. Patient reports a fair appetite at baseline, typically consumes 2 meals daily plus one snack. Mostly enjoys cultural foods, such as roti, fish, and valentin with rice.    Noted newly diagnosed diabetes mellitus per chart review and HbA1c 9.5% (12/28). Patient was not following a specific dietary pattern or taking diabetes-related medications prior to admission. Patient provided with verbal and written education regarding nutrition in the context of diabetes mellitus management. Printed handout regarding Carbohydrate Counting for People with Diabetes provided to patient. Encouraged patient to follow-up with an outpatient dietitian for continued nutrition counseling. Patient verbalized understanding, however displayed resistance throughout education as well as food and nutrition-related knowledge deficit (i.e. "Pork is white meat").     Patient reports usual body weight as 235lb. Reports 5lb weight loss during course of admission (x2 days)?  Per Diogo GARSIA, noted the following weight history: 115kg (12/27). No other weight history data available for review at this time.

## 2023-12-29 NOTE — CHART NOTE - NSCHARTNOTEFT_GEN_A_CORE
CT a/p pelvis notable for Obstructing 5 mm calculus in the left UVJ with mild left hydroureteronephrosis  Urology curbsided, no acute intervention. Recs started flomax 0.4mg qd to help pass stone and outpt urology f/u.   Discussed with Dr. Argueta.

## 2023-12-29 NOTE — DIETITIAN INITIAL EVALUATION ADULT - PERTINENT LABORATORY DATA
12-29    135  |  100  |  14  ----------------------------<  177<H>  3.8   |  24  |  0.82    Ca    8.4      29 Dec 2023 05:33  Phos  3.6     12-29  Mg     2.00     12-29    TPro  7.2  /  Alb  3.8  /  TBili  0.4  /  DBili  x   /  AST  63<H>  /  ALT  104<H>  /  AlkPhos  103  12-28  POCT Blood Glucose.: 165 mg/dL (12-29-23 @ 12:20)  A1C with Estimated Average Glucose Result: 9.5 % (12-28-23 @ 04:53)

## 2023-12-29 NOTE — PROGRESS NOTE ADULT - ASSESSMENT
{\rtf1\dxyxwz58436\ansi\vsmcaqf1928\ftnbj\uc1\deff0  {\fonttbl{\f0 \fnil Segoe UI;}{\f1 \fnil \fcharset0 Segoe UI;}{\f2 \fnil Times New Anderson;}}  {\colortbl ;\rdf067\uzsdx821\pzbx376 ;\red0\green0\blue0 ;\red0\green0\vcjk449 ;\red0\green0\blue0 ;}  {\stylesheet{\f0\fs20 Normal;}{\cs1 Default Paragraph Font;}{\cs2\f0\fs16 Line Number;}{\cs3\f2\fs24\ul\cf3 Hyperlink;}}  {\*\revtbl{Unknown;}}  \zqhbkg13029\zoonmj72063\nfajt9499\qgnph0747\ghetr8897\jinvy9393\hdxgtml149\xgwjijn801\nogrowautofit\kuupzo875\formshade\nofeaturethrottle1\dntblnsbdb\fet4\aendnotes\aftnnrlc\pgbrdrhead\pgbrdrfoot  \sectd\bouunk20358\mudfeg73165\guttersxn0\kqyynwrn8271\oarkzivw9525\usazvxcz3152\lqnpfywh2825\atcjftg192\acvuizb701\sbkpage\pgncont\pgndec  \plain\plain\f0\fs24\ql\plain\f0\fs24\plain\f1\fs20\xhzy9379\hich\f1\dbch\f1\loch\f1\cf2\fs20\strike\plain\f1\fs20\bafz9548\hich\f1\dbch\f1\loch\f1\cf2\fs20{\*\bkmkstart [id number]}{\*\bkmkend [id number]}\plain\f1\fs20\lfvi1217\hich\f1\dbch\f1\loch\f1\cf2\fs20\b\ul   Assessment and Recommendation:\plain\f0\fs20\hboq3750\hich\f0\dbch\f0\loch\f0\fs20  \par  \trowd\sochzl98\lastrow\xbnvpju11\trpaddfl3\hdymifm34\trpaddfr3\trpaddt0\trpaddft3\trpaddb0\trpaddfb3\trleft0  \clvertalt\tsciow10\clpadft3\lwrhzx18\clpadfr3\clpadl0\clpadfl3\clpadb0\clpadfb3\mqvfo6285  \clvertalt\bbqtrp83\clpadft3\rjclph78\clpadfr3\clpadl0\clpadfl3\clpadb0\clpadfb3\fktdy0389  \pard\intbl\ssparaaux0\s0\fi-120\li120\ql\plain\f0\fs24{\*\bkmkstart [id number]}{\*\bkmkend [id number]}\plain\f0\fs20\htjr9095\hich\f0\dbch\f0\loch\f0\fs20 \'b7 \plain\f1\fs20\fhvp6170\hich\f1\dbch\f1\loch\f1\cf2\fs20\b Assessment\plain\f0\fs20\pbxi9659\hich\f0\dbch\f0\loch\f0\fs20\cell  \pard\intbl\ssparaaux0\s0\ql\plain\f0\fs24\plain\f0\fs20\xgam1268\hich\f0\dbch\f0\loch\f0\fs20\cell  \intbl\row  \pard\ssparaaux0\s0\ql\plain\f0\fs24\plain\f0\fs20\ctvi9219\hich\f0\dbch\f0\loch\f0\fs20 43M newly diagnosed type 2 DM, also with transaminitis, fatty liver on abd US, HTN presenting with CP.\par  \par  1) DM2\par  New diagnosis\par  HbA1c 9.5%\par  reviewed HBA1c meaning and goal < 7% moving forward to prevent DM complications\par  \par  While inpatient:\par  BG target 100-180 mg/dl\par  Continue Lantus 16 units qhs\par  Continue Admelog 4/4/4 units premeal TID\par  Continue moderate Admelog mealtime scale and low bedtime scale\par  Change to carb consistent diet\par  RD consult pending \par  Provider to RN to teach glucometer\par  \par  Discharge plan:\par  metformin 500mg BID with food, then after 1 week increase to 1000mg BID with food\par  stop daily sugary energy drink\par  overall improve diet and add exercise\par  stop daily vodka\par  Home glucose monitoring\par  prescribe glucometer, test strips, lancets, alcohol pads\par  Discussed benefits of GLP1 for DM2 and NAFLD, he may consider adding this as outpatient\par  opthalmology eval as outpatient\par  \par  2) NAFLD\par  importance of weight loss, dietary improvements, stop alcohol\par  may add GLP1 as outpatient\par  trend LFTS\par  \par  3) HTN\par  BP goal < 130/80\par  outpatient albumin/creat\par  \par  4) Hyperlipidemia\par   - would benefit from statin depending on LFT elevation if acute vs chronic\par  \par  Follow up outpatient with primary care physician and/or [facility] endocrinology [phone]\par  \par  \pard\plain\f0\fs24\plain\f0\fs20\jobc1366\hich\f0\dbch\f0\loch\f0\fs20 VARSHA Broussard\par  Nurse Practitioner \par  Division of Endocrinology\par  Pager: JANET pager [phone]\par  \par  If out of hospital/unavailable when paged, please note: patient will be cared for by another provider on the endocrine service.  Please call the endocrine answering service for assistance to reach covering provider ([phone]). For non-urgent matters,   please email [email or web address] for assistance. \par  \par  \ql\plain\f0\fs24\plain\f0\fs20\zxlb8768\hich\f0\dbch\f0\loch\f0\fs20\par  }   {\rtf1\spwkfm37431\ansi\vzysgti3708\ftnbj\uc1\deff0  {\fonttbl{\f0 \fnil Segoe UI;}{\f1 \fnil \fcharset0 Segoe UI;}{\f2 \fnil Times New Anderson;}}  {\colortbl ;\ncj320\hsmai446\yvej777 ;\red0\green0\blue0 ;\red0\green0\gtwe345 ;\red0\green0\blue0 ;}  {\stylesheet{\f0\fs20 Normal;}{\cs1 Default Paragraph Font;}{\cs2\f0\fs16 Line Number;}{\cs3\f2\fs24\ul\cf3 Hyperlink;}}  {\*\revtbl{Unknown;}}  \frrkps59743\lpvrrj71551\sfbmi6098\yfevk2800\zwecw9233\cdtgc7710\qkeyrwj250\rldnbsl288\nogrowautofit\fkhwhm885\formshade\nofeaturethrottle1\dntblnsbdb\fet4\aendnotes\aftnnrlc\pgbrdrhead\pgbrdrfoot  \sectd\ecpbpu09899\tvryny47930\guttersxn0\amjhpcpl8573\nmthgaao2492\znyllnyl3805\ifpgnvxj4036\oxgaakp002\izslroa164\sbkpage\pgncont\pgndec  \plain\plain\f0\fs24\ql\plain\f0\fs24\plain\f1\fs20\wzvp1932\hich\f1\dbch\f1\loch\f1\cf2\fs20\strike\plain\f1\fs20\lrac3547\hich\f1\dbch\f1\loch\f1\cf2\fs20{\*\bkmkstart [id number]}{\*\bkmkend [id number]}\plain\f1\fs20\aacs7694\hich\f1\dbch\f1\loch\f1\cf2\fs20\b\ul   Assessment and Recommendation:\plain\f0\fs20\snhm5672\hich\f0\dbch\f0\loch\f0\fs20  \par  \trowd\dbsoqm04\lastrow\yeavysf99\trpaddfl3\yslckka14\trpaddfr3\trpaddt0\trpaddft3\trpaddb0\trpaddfb3\trleft0  \clvertalt\xeklat66\clpadft3\yvstex21\clpadfr3\clpadl0\clpadfl3\clpadb0\clpadfb3\xaffc3770  \clvertalt\isesug89\clpadft3\mbtcrr26\clpadfr3\clpadl0\clpadfl3\clpadb0\clpadfb3\eptfq6299  \pard\intbl\ssparaaux0\s0\fi-120\li120\ql\plain\f0\fs24{\*\bkmkstart [id number]}{\*\bkmkend [id number]}\plain\f0\fs20\ncqd9916\hich\f0\dbch\f0\loch\f0\fs20 \'b7 \plain\f1\fs20\ooha7065\hich\f1\dbch\f1\loch\f1\cf2\fs20\b Assessment\plain\f0\fs20\cfom3165\hich\f0\dbch\f0\loch\f0\fs20\cell  \pard\intbl\ssparaaux0\s0\ql\plain\f0\fs24\plain\f0\fs20\qoro2889\hich\f0\dbch\f0\loch\f0\fs20\cell  \intbl\row  \pard\ssparaaux0\s0\ql\plain\f0\fs24\plain\f0\fs20\fzxd7307\hich\f0\dbch\f0\loch\f0\fs20 43M newly diagnosed type 2 DM, also with transaminitis, fatty liver on abd US, HTN presenting with CP.\par  \par  1) DM2\par  New diagnosis\par  HbA1c 9.5%\par  reviewed HBA1c meaning and goal < 7% moving forward to prevent DM complications\par  \par  While inpatient:\par  BG target 100-180 mg/dl\par  Continue Lantus 16 units qhs\par  Continue Admelog 4/4/4 units premeal TID\par  Continue moderate Admelog mealtime scale and low bedtime scale\par  Change to carb consistent diet\par  RD consult pending \par  Provider to RN to teach glucometer\par  \par  Discharge plan:\par  metformin 500mg BID with food, then after 1 week increase to 1000mg BID with food\par  stop daily sugary energy drink\par  overall improve diet and add exercise\par  stop daily vodka\par  Home glucose monitoring\par  prescribe glucometer, test strips, lancets, alcohol pads\par  Discussed benefits of GLP1 for DM2 and NAFLD, he may consider adding this as outpatient\par  opthalmology eval as outpatient\par  \par  2) NAFLD\par  importance of weight loss, dietary improvements, stop alcohol\par  may add GLP1 as outpatient\par  trend LFTS\par  \par  3) HTN\par  BP goal < 130/80\par  outpatient albumin/creat\par  \par  4) Hyperlipidemia\par   - would benefit from statin depending on LFT elevation if acute vs chronic\par  \par  Follow up outpatient with primary care physician and/or [facility] endocrinology [phone]\par  \par  \pard\plain\f0\fs24\plain\f0\fs20\perh8819\hich\f0\dbch\f0\loch\f0\fs20 VARSHA Broussard\par  Nurse Practitioner \par  Division of Endocrinology\par  Pager: JANET pager [phone]\par  \par  If out of hospital/unavailable when paged, please note: patient will be cared for by another provider on the endocrine service.  Please call the endocrine answering service for assistance to reach covering provider ([phone]). For non-urgent matters,   please email [email or web address] for assistance. \par  \par  \ql\plain\f0\fs24\plain\f0\fs20\dktp3426\hich\f0\dbch\f0\loch\f0\fs20\par  }   Assessment and Recommendation:   · Assessment	  43M newly diagnosed type 2 DM, also with transaminitis, fatty liver on abd US, HTN presenting with CP.    1) DM2  New diagnosis  HbA1c 9.5%  reviewed HBA1c meaning and goal < 7% moving forward to prevent DM complications    While inpatient:  BG target 100-180 mg/dl  Continue Lantus 16 units qhs  Continue Admelog 4/4/4 units premeal TID  Continue moderate Admelog mealtime scale and low bedtime scale  Change to carb consistent diet  RD consult pending   Provider to RN to teach glucometer    Discharge plan:  metformin 500mg BID with food, then after 1 week increase to 1000mg BID with food - start 72 hours post contrast dye   stop daily sugary energy drink  overall improve diet and add exercise  stop daily vodka  Home glucose monitoring  prescribe glucometer, test strips, lancets, alcohol pads  Discussed benefits of GLP1 for DM2 and NAFLD, he may consider adding this as outpatient  opthalmology eval as outpatient    2) NAFLD  importance of weight loss, dietary improvements, stop alcohol  may add GLP1 as outpatient  trend LFTS    3) HTN  BP goal < 130/80  outpatient albumin/creat    4) Hyperlipidemia   - would benefit from statin depending on LFT elevation if acute vs chronic    Follow up outpatient with primary care physician and/or [facility] endocrinology [phone]    RUBIN Broussard-BC  Nurse Practitioner   Division of Endocrinology  Pager: JANET pager [phone]    If out of hospital/unavailable when paged, please note: patient will be cared for by another provider on the endocrine service.  Please call the endocrine answering service for assistance to reach covering provider ([phone]). For non-urgent matters, please email [email or web address] for assistance.      Assessment and Recommendation:   · Assessment	  43M newly diagnosed type 2 DM, also with transaminitis, fatty liver on abd US, HTN presenting with CP.    1) DM2  New diagnosis  HbA1c 9.5%  reviewed HBA1c meaning and goal < 7% moving forward to prevent DM complications    While inpatient:  BG target 100-180 mg/dl  Continue Lantus 16 units qhs  Continue Admelog 4/4/4 units premeal TID  Continue moderate Admelog mealtime scale and low bedtime scale  Change to carb consistent diet  RD consult pending   Provider to RN to teach glucometer    Discharge plan:  metformin 500mg BID with food, then after 1 week increase to 1000mg BID with food - start 72 hours post contrast dye   stop daily sugary energy drink  overall improve diet and add exercise  stop daily vodka  Home glucose monitoring  prescribe glucometer, test strips, lancets, alcohol pads  Discussed benefits of GLP1 for DM2 and NAFLD, he may consider adding this as outpatient  opthalmology eval as outpatient    2) NAFLD  importance of weight loss, dietary improvements, stop alcohol  may add GLP1 as outpatient  trend LFTS    3) HTN  BP goal < 130/80  outpatient albumin/creat    4) Hyperlipidemia   - would benefit from statin depending on LFT elevation if acute vs chronic    Follow up outpatient with primary care physician and/or [facility] endocrinology [phone]    RUBIN Broussard-BC  Nurse Practitioner   Division of Endocrinology  Pager: JANET pager [phone]    If out of hospital/unavailable when paged, please note: patient will be cared for by another provider on the endocrine service.  Please call the endocrine answering service for assistance to reach covering provider ([phone]). For non-urgent matters, please email [email or web address] for assistance.

## 2023-12-29 NOTE — DIETITIAN INITIAL EVALUATION ADULT - ADD RECOMMEND
1. Monitor weights, labs, BM's, skin integrity, p.o. intake.   2. Please monitor % PO intake on flowsheets   3. Honor food preferences as able within therapeutic diet order.   4. Recommend patient follow-up with outpatient dietitian for continued nutrition counseling.

## 2023-12-29 NOTE — DIETITIAN INITIAL EVALUATION ADULT - NAME AND PHONE
Aaliyah Chambers MS RDN CDN  On [employer], Pager #[phone]  Aaliyah Chambers MS RDN CDN  On [employer], Pager #[phone]

## 2023-12-29 NOTE — DISCHARGE NOTE PROVIDER - NSDCFUADDAPPT_GEN_ALL_CORE_FT
Follow-up with nutrition for diabetes management.   [address] [address], [place] [address], [place] [address]  Phone: [phone] Follow-up with nutrition for diabetes management.   [address] [address], [place] [address], [place] [address]  Phone: [phone]

## 2023-12-29 NOTE — DISCHARGE NOTE NURSING/CASE MANAGEMENT/SOCIAL WORK - NSDCFUADDAPPT_GEN_ALL_CORE_FT
Follow-up with nutrition for diabetes management.   [address] [address], [place] [address], [place] [address]  Phone: [phone] Follow-up with nutrition for diabetes management.   [address] [address], [place] [address], [place] [address]  Phone: [phone]

## 2023-12-29 NOTE — DIETITIAN INITIAL EVALUATION ADULT - OBTAIN WEEKLY WEIGHT
Spoke with Dr. Akbar's nurse. Let her know Dr Ibarra increased Cozaar from 25mg to 50mg today and send 1 month refill to her pharmacy. Dr Akbar will continue to manage HTN.     yes

## 2023-12-29 NOTE — DISCHARGE NOTE NURSING/CASE MANAGEMENT/SOCIAL WORK - PATIENT PORTAL LINK FT
You can access the FollowMyHealth Patient Portal offered by [facility] by registering at the following website: [email or web address]. By joining [employer]’s FollowMyHealth portal, you will also be able to view your health information using other applications (apps) compatible with our system. You can access the FollowMyHealth Patient Portal offered by [facility] by registering at the following website: [email or web address]. By joining [employer]’s FollowMyHealth portal, you will also be able to view your health information using other applications (apps) compatible with our system.

## 2023-12-29 NOTE — DISCHARGE NOTE PROVIDER - NSDCMRMEDTOKEN_GEN_ALL_CORE_FT
alcohol swabs: Apply topically to affected area 4 times a day  aspirin 81 mg oral delayed release tablet: 1 tab(s) orally once a day  glucometer (per patient&#x27;s insurance): Test blood sugars four times a day. Dispense #1 glucometer.  lancets: 1 application subcutaneously 4 times a day  Lipitor 20 mg oral tablet: 1 tab(s) orally once a day  metFORMIN 500 mg oral tablet: 1 tab(s) orally 2 times a day Please start on January 2nd.   Please take 500mg (1 tab) twice a day with food for 7 days.   After 7 days, please take 1000mg (2 tabs) twice a day with food.  oxycodone-acetaminophen 5 mg-325 mg oral tablet: 1 tab(s) orally every 4 hours as needed for Moderate Pain (4 - 6) MDD: 6 tabs  pantoprazole 40 mg oral delayed release tablet: 1 tab(s) orally once a day (before a meal)  tamsulosin 0.4 mg oral capsule: 1 cap(s) orally once a day (at bedtime)  test strips (per patient&#x27;s insurance): 1 application subcutaneously 4 times a day. ** Compatible with patient&#x27;s glucometer **

## 2023-12-29 NOTE — DISCHARGE NOTE PROVIDER - NSFOLLOWUPCLINICSTOKEN_GEN_ALL_ED_FT
251038: || ||00\01||False;516651: || ||00\01||False; 504743: || ||00\01||False;172912: || ||00\01||False;

## 2023-12-29 NOTE — PROGRESS NOTE ADULT - SUBJECTIVE AND OBJECTIVE BOX
DATE OF SERVICE: 12-29-23    Patient denies chest pain or shortness of breath.   Review of symptoms otherwise negative.    MEDICATIONS:  acetaminophen     Tablet .. 650 milliGRAM(s) Oral every 6 hours PRN  aspirin enteric coated 81 milliGRAM(s) Oral daily  dextrose 5%. 1000 milliLiter(s) IV Continuous <Continuous>  dextrose 5%. 1000 milliLiter(s) IV Continuous <Continuous>  dextrose 50% Injectable 12.5 Gram(s) IV Push once  dextrose 50% Injectable 25 Gram(s) IV Push once  dextrose 50% Injectable 25 Gram(s) IV Push once  dextrose Oral Gel 15 Gram(s) Oral once PRN  glucagon  Injectable 1 milliGRAM(s) IntraMuscular once  influenza   Vaccine 0.5 milliLiter(s) IntraMuscular once  insulin glargine Injectable (LANTUS) 16 Unit(s) SubCutaneous at bedtime  insulin lispro (ADMELOG) corrective regimen sliding scale   SubCutaneous three times a day before meals  insulin lispro (ADMELOG) corrective regimen sliding scale   SubCutaneous at bedtime  insulin lispro Injectable (ADMELOG) 4 Unit(s) SubCutaneous three times a day before meals  metoprolol tartrate 25 milliGRAM(s) Oral every 6 hours  morphine  - Injectable 2 milliGRAM(s) IV Push every 4 hours PRN  oxycodone    5 mG/acetaminophen 325 mG 1 Tablet(s) Oral every 4 hours PRN  pantoprazole    Tablet 40 milliGRAM(s) Oral before breakfast  sodium chloride 0.9%. 1000 milliLiter(s) IV Continuous <Continuous>      LABS:                        15.6   10.97 )-----------( 257      ( 29 Dec 2023 05:33 )             46.6       Hemoglobin: 15.6 g/dL (12-29 @ 05:33)  Hemoglobin: 16.2 g/dL (12-27 @ 15:49)      12-29    135  |  100  |  14  ----------------------------<  177<H>  3.8   |  24  |  0.82    Ca    8.4      29 Dec 2023 05:33  Phos  3.6     12-29  Mg     2.00     12-29    TPro  7.2  /  Alb  3.8  /  TBili  0.4  /  DBili  x   /  AST  63<H>  /  ALT  104<H>  /  AlkPhos  103  12-28    Creatinine Trend: 0.82<--, 0.74<--, 0.72<--      PHYSICAL EXAM:  T(C): 36.3 (12-29-23 @ 06:10), Max: 37.2 (12-28-23 @ 10:35)  HR: 60 (12-29-23 @ 06:10) (60 - 85)  BP: 114/95 (12-29-23 @ 06:10) (112/71 - 146/98)  RR: 18 (12-29-23 @ 06:10) (17 - 18)  SpO2: 98% (12-29-23 @ 06:10) (98% - 99%)  Wt(kg): --    I&O's Summary      General:  Alert and Oriented * 3.   Head: Normocephalic and atraumatic.   Neck: No JVD. No bruits. Supple. Does not appear to be enlarged.   Cardiovascular: + S1,S2 ; RRR Soft systolic murmur at the left lower sternal border. No rubs noted.    Lungs: CTA b/l. No rhonchi, rales or wheezes.   Abdomen: + BS, soft. Non tender. Non distended. No rebound. No guarding.   Extremities: No clubbing/cyanosis/edema.   Neurologic: Moves all four extremities. Full range of motion.   Skin: Warm and moist. The patient's skin has normal elasticity and good skin turgor.   Psychiatric: Appropriate mood and affect.  Musculoskeletal: Normal range of motion, normal strength     TELEMETRY: 	  NSR    ECG:  	NSR    Stress 06/2023  Normal myocardial perfusion SPECT images No evidence of stress induced ischemia or infarction.  Normal left ventricular size and function.  Calculated EF is: 65%     Holter 06/2023  Sinus rhythm.  Rare APCs and VPCs    TTE  1. Normal left ventricular size and function.  Mild diastolic dysfunction.  2. Normal left atrial size  3. Right atrial cavity is normal in size.  4. Normal right ventricular size and function.  5. Normal trileaflet aortic valve opening.  6. Normal mitral valve opening.  7. Normal appearing tricuspid valve with mild tricuspid  regurgitation.  8. Pulmonic valve is grossly normal, yet poorly visualized  with no doppler evidence for pulmonic stenosis.  9. No evidence of significant pericardial effusion.  10. The aortic root is normal.  11. Normal pulmonary artery.  12. IVC is normal with respiratory variation.  FULL STUDY DONE INCLUDING M-MODE RECORDING,  SPECTRAL DOPPLER AND COLOR FLOW  ECHOCARDIOGRAPHY      ASSESSMENT/PLAN: Pt is a 43M,  pmh of [facility], who is sent from office for central burning chest pain that can get worse with food. He also reports an exertional component and some SOB. 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 Pain is left sided, non radiating denies any palpitations, orthopnea, PND or recent fevers. HE does not smoke, denies any illicit drug use No belly pain, nvd, dysuria, hematuria, recent travel, trauma, syncope.    1. Chest Pain  - atypical with typical features  - trop negative, EKG non ischemic, recent negative stress where he exercised for almost 12 minus and had no perfusion defects on stress imaging  - check CCTA for atypical pain and risk factors  - start Metoprolol 25 q 6 hours to lower heart rate for CCTA, current heart rate in  60s  - c/w PPI  - c/w Amlodipine  - if CCTA negative will start NSAIDS for MSK pain  - start Atorvastatin 40 mg  - DM2 tx per medicine    Maria Luisa Geller MD  Pager: [phone]        DATE OF SERVICE: 12-29-23    Patient denies chest pain or shortness of breath.   Review of symptoms otherwise negative.    MEDICATIONS:  acetaminophen     Tablet .. 650 milliGRAM(s) Oral every 6 hours PRN  aspirin enteric coated 81 milliGRAM(s) Oral daily  dextrose 5%. 1000 milliLiter(s) IV Continuous <Continuous>  dextrose 5%. 1000 milliLiter(s) IV Continuous <Continuous>  dextrose 50% Injectable 12.5 Gram(s) IV Push once  dextrose 50% Injectable 25 Gram(s) IV Push once  dextrose 50% Injectable 25 Gram(s) IV Push once  dextrose Oral Gel 15 Gram(s) Oral once PRN  glucagon  Injectable 1 milliGRAM(s) IntraMuscular once  influenza   Vaccine 0.5 milliLiter(s) IntraMuscular once  insulin glargine Injectable (LANTUS) 16 Unit(s) SubCutaneous at bedtime  insulin lispro (ADMELOG) corrective regimen sliding scale   SubCutaneous three times a day before meals  insulin lispro (ADMELOG) corrective regimen sliding scale   SubCutaneous at bedtime  insulin lispro Injectable (ADMELOG) 4 Unit(s) SubCutaneous three times a day before meals  metoprolol tartrate 25 milliGRAM(s) Oral every 6 hours  morphine  - Injectable 2 milliGRAM(s) IV Push every 4 hours PRN  oxycodone    5 mG/acetaminophen 325 mG 1 Tablet(s) Oral every 4 hours PRN  pantoprazole    Tablet 40 milliGRAM(s) Oral before breakfast  sodium chloride 0.9%. 1000 milliLiter(s) IV Continuous <Continuous>      LABS:                        15.6   10.97 )-----------( 257      ( 29 Dec 2023 05:33 )             46.6       Hemoglobin: 15.6 g/dL (12-29 @ 05:33)  Hemoglobin: 16.2 g/dL (12-27 @ 15:49)      12-29    135  |  100  |  14  ----------------------------<  177<H>  3.8   |  24  |  0.82    Ca    8.4      29 Dec 2023 05:33  Phos  3.6     12-29  Mg     2.00     12-29    TPro  7.2  /  Alb  3.8  /  TBili  0.4  /  DBili  x   /  AST  63<H>  /  ALT  104<H>  /  AlkPhos  103  12-28    Creatinine Trend: 0.82<--, 0.74<--, 0.72<--      PHYSICAL EXAM:  T(C): 36.3 (12-29-23 @ 06:10), Max: 37.2 (12-28-23 @ 10:35)  HR: 60 (12-29-23 @ 06:10) (60 - 85)  BP: 114/95 (12-29-23 @ 06:10) (112/71 - 146/98)  RR: 18 (12-29-23 @ 06:10) (17 - 18)  SpO2: 98% (12-29-23 @ 06:10) (98% - 99%)  Wt(kg): --    I&O's Summary      General:  Alert and Oriented * 3.   Head: Normocephalic and atraumatic.   Neck: No JVD. No bruits. Supple. Does not appear to be enlarged.   Cardiovascular: + S1,S2 ; RRR Soft systolic murmur at the left lower sternal border. No rubs noted.    Lungs: CTA b/l. No rhonchi, rales or wheezes.   Abdomen: + BS, soft. Non tender. Non distended. No rebound. No guarding.   Extremities: No clubbing/cyanosis/edema.   Neurologic: Moves all four extremities. Full range of motion.   Skin: Warm and moist. The patient's skin has normal elasticity and good skin turgor.   Psychiatric: Appropriate mood and affect.  Musculoskeletal: Normal range of motion, normal strength     TELEMETRY: 	  NSR    ECG:  	NSR    Stress 06/2023  Normal myocardial perfusion SPECT images No evidence of stress induced ischemia or infarction.  Normal left ventricular size and function.  Calculated EF is: 65%     Holter 06/2023  Sinus rhythm.  Rare APCs and VPCs    TTE  1. Normal left ventricular size and function.  Mild diastolic dysfunction.  2. Normal left atrial size  3. Right atrial cavity is normal in size.  4. Normal right ventricular size and function.  5. Normal trileaflet aortic valve opening.  6. Normal mitral valve opening.  7. Normal appearing tricuspid valve with mild tricuspid  regurgitation.  8. Pulmonic valve is grossly normal, yet poorly visualized  with no doppler evidence for pulmonic stenosis.  9. No evidence of significant pericardial effusion.  10. The aortic root is normal.  11. Normal pulmonary artery.  12. IVC is normal with respiratory variation.  FULL STUDY DONE INCLUDING M-MODE RECORDING,  SPECTRAL DOPPLER AND COLOR FLOW  ECHOCARDIOGRAPHY      ASSESSMENT/PLAN: Pt is a 43M,  pmh of [facility], who is sent from office for central burning chest pain that can get worse with food. He also reports an exertional component and some SOB. 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 Pain is left sided, non radiating denies any palpitations, orthopnea, PND or recent fevers. HE does not smoke, denies any illicit drug use No belly pain, nvd, dysuria, hematuria, recent travel, trauma, syncope.    1. Chest Pain  - atypical with typical features  - trop negative, EKG non ischemic, recent negative stress where he exercised for almost 12 minus and had no perfusion defects on stress imaging  - check CCTA for atypical pain and risk factors  - start Metoprolol 25 q 6 hours to lower heart rate for CCTA, current heart rate in  60s  - c/w PPI  - c/w Amlodipine  - if CCTA negative will start NSAIDS for MSK pain  - start Atorvastatin 40 mg  - DM2 tx per medicine    Maria Luisa Geller MD  Pager: [phone]        DATE OF SERVICE: 12-29-23    Patient denies chest pain or shortness of breath.   Review of symptoms otherwise negative.    MEDICATIONS:  acetaminophen     Tablet .. 650 milliGRAM(s) Oral every 6 hours PRN  aspirin enteric coated 81 milliGRAM(s) Oral daily  dextrose 5%. 1000 milliLiter(s) IV Continuous <Continuous>  dextrose 5%. 1000 milliLiter(s) IV Continuous <Continuous>  dextrose 50% Injectable 12.5 Gram(s) IV Push once  dextrose 50% Injectable 25 Gram(s) IV Push once  dextrose 50% Injectable 25 Gram(s) IV Push once  dextrose Oral Gel 15 Gram(s) Oral once PRN  glucagon  Injectable 1 milliGRAM(s) IntraMuscular once  influenza   Vaccine 0.5 milliLiter(s) IntraMuscular once  insulin glargine Injectable (LANTUS) 16 Unit(s) SubCutaneous at bedtime  insulin lispro (ADMELOG) corrective regimen sliding scale   SubCutaneous three times a day before meals  insulin lispro (ADMELOG) corrective regimen sliding scale   SubCutaneous at bedtime  insulin lispro Injectable (ADMELOG) 4 Unit(s) SubCutaneous three times a day before meals  metoprolol tartrate 25 milliGRAM(s) Oral every 6 hours  morphine  - Injectable 2 milliGRAM(s) IV Push every 4 hours PRN  oxycodone    5 mG/acetaminophen 325 mG 1 Tablet(s) Oral every 4 hours PRN  pantoprazole    Tablet 40 milliGRAM(s) Oral before breakfast  sodium chloride 0.9%. 1000 milliLiter(s) IV Continuous <Continuous>      LABS:                        15.6   10.97 )-----------( 257      ( 29 Dec 2023 05:33 )             46.6       Hemoglobin: 15.6 g/dL (12-29 @ 05:33)  Hemoglobin: 16.2 g/dL (12-27 @ 15:49)      12-29    135  |  100  |  14  ----------------------------<  177<H>  3.8   |  24  |  0.82    Ca    8.4      29 Dec 2023 05:33  Phos  3.6     12-29  Mg     2.00     12-29    TPro  7.2  /  Alb  3.8  /  TBili  0.4  /  DBili  x   /  AST  63<H>  /  ALT  104<H>  /  AlkPhos  103  12-28    Creatinine Trend: 0.82<--, 0.74<--, 0.72<--      PHYSICAL EXAM:  T(C): 36.3 (12-29-23 @ 06:10), Max: 37.2 (12-28-23 @ 10:35)  HR: 60 (12-29-23 @ 06:10) (60 - 85)  BP: 114/95 (12-29-23 @ 06:10) (112/71 - 146/98)  RR: 18 (12-29-23 @ 06:10) (17 - 18)  SpO2: 98% (12-29-23 @ 06:10) (98% - 99%)  Wt(kg): --    I&O's Summary      General:  Alert and Oriented * 3.   Head: Normocephalic and atraumatic.   Neck: No JVD. No bruits. Supple. Does not appear to be enlarged.   Cardiovascular: + S1,S2 ; RRR Soft systolic murmur at the left lower sternal border. No rubs noted.    Lungs: CTA b/l. No rhonchi, rales or wheezes.   Abdomen: + BS, soft. Non tender. Non distended. No rebound. No guarding.   Extremities: No clubbing/cyanosis/edema.   Neurologic: Moves all four extremities. Full range of motion.   Skin: Warm and moist. The patient's skin has normal elasticity and good skin turgor.   Psychiatric: Appropriate mood and affect.  Musculoskeletal: Normal range of motion, normal strength     TELEMETRY: 	  NSR    ECG:  	NSR    Stress 06/2023  Normal myocardial perfusion SPECT images No evidence of stress induced ischemia or infarction.  Normal left ventricular size and function.  Calculated EF is: 65%     Holter 06/2023  Sinus rhythm.  Rare APCs and VPCs    TTE  1. Normal left ventricular size and function.  Mild diastolic dysfunction.  2. Normal left atrial size  3. Right atrial cavity is normal in size.  4. Normal right ventricular size and function.  5. Normal trileaflet aortic valve opening.  6. Normal mitral valve opening.  7. Normal appearing tricuspid valve with mild tricuspid  regurgitation.  8. Pulmonic valve is grossly normal, yet poorly visualized  with no doppler evidence for pulmonic stenosis.  9. No evidence of significant pericardial effusion.  10. The aortic root is normal.  11. Normal pulmonary artery.  12. IVC is normal with respiratory variation.  FULL STUDY DONE INCLUDING M-MODE RECORDING,  SPECTRAL DOPPLER AND COLOR FLOW  ECHOCARDIOGRAPHY    TTE 12/28/2023      1. Left ventricular cavity is normal. Left ventricular wall thickness is normal. Left ventricular systolic function is normal with an ejection fraction of 63 % by Fong's method of disks. There are no regional wall motion abnormalities seen.   2. Normal left ventricular diastolic function, with normal filling pressure.   3. Normal right ventricular cavity size, wall thickness, and systolic function.   4. No significant valvular disease.   5. No pericardial effusion seen.        ASSESSMENT/PLAN: Pt is a 43M,  pmh of [facility], who is sent from office for central burning chest pain that can get worse with food. He also reports an exertional component and some SOB. 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 Pain is left sided, non radiating denies any palpitations, orthopnea, PND or recent fevers. HE does not smoke, denies any illicit drug use No belly pain, nvd, dysuria, hematuria, recent travel, trauma, syncope.    1. Chest Pain  - atypical with typical features  - trop negative, EKG non ischemic, recent negative stress where he exercised for almost 12 minus and had no perfusion defects on stress imaging  - check CCTA for atypical pain and risk factors  - start Metoprolol 25 q 6 hours to lower heart rate for CCTA, current heart rate in  60s  - c/w PPI  - c/w Amlodipine  - if CCTA negative will start NSAIDS for MSK pain  - will start statin as outpatient in setting of current increased LFTs and hepatic steatosis  - DM2 tx per medicine/endocrine    Maria Luisa Geller MD  Pager: [phone]        DATE OF SERVICE: 12-29-23    Patient denies chest pain or shortness of breath.   Review of symptoms otherwise negative.    MEDICATIONS:  acetaminophen     Tablet .. 650 milliGRAM(s) Oral every 6 hours PRN  aspirin enteric coated 81 milliGRAM(s) Oral daily  dextrose 5%. 1000 milliLiter(s) IV Continuous <Continuous>  dextrose 5%. 1000 milliLiter(s) IV Continuous <Continuous>  dextrose 50% Injectable 12.5 Gram(s) IV Push once  dextrose 50% Injectable 25 Gram(s) IV Push once  dextrose 50% Injectable 25 Gram(s) IV Push once  dextrose Oral Gel 15 Gram(s) Oral once PRN  glucagon  Injectable 1 milliGRAM(s) IntraMuscular once  influenza   Vaccine 0.5 milliLiter(s) IntraMuscular once  insulin glargine Injectable (LANTUS) 16 Unit(s) SubCutaneous at bedtime  insulin lispro (ADMELOG) corrective regimen sliding scale   SubCutaneous three times a day before meals  insulin lispro (ADMELOG) corrective regimen sliding scale   SubCutaneous at bedtime  insulin lispro Injectable (ADMELOG) 4 Unit(s) SubCutaneous three times a day before meals  metoprolol tartrate 25 milliGRAM(s) Oral every 6 hours  morphine  - Injectable 2 milliGRAM(s) IV Push every 4 hours PRN  oxycodone    5 mG/acetaminophen 325 mG 1 Tablet(s) Oral every 4 hours PRN  pantoprazole    Tablet 40 milliGRAM(s) Oral before breakfast  sodium chloride 0.9%. 1000 milliLiter(s) IV Continuous <Continuous>      LABS:                        15.6   10.97 )-----------( 257      ( 29 Dec 2023 05:33 )             46.6       Hemoglobin: 15.6 g/dL (12-29 @ 05:33)  Hemoglobin: 16.2 g/dL (12-27 @ 15:49)      12-29    135  |  100  |  14  ----------------------------<  177<H>  3.8   |  24  |  0.82    Ca    8.4      29 Dec 2023 05:33  Phos  3.6     12-29  Mg     2.00     12-29    TPro  7.2  /  Alb  3.8  /  TBili  0.4  /  DBili  x   /  AST  63<H>  /  ALT  104<H>  /  AlkPhos  103  12-28    Creatinine Trend: 0.82<--, 0.74<--, 0.72<--      PHYSICAL EXAM:  T(C): 36.3 (12-29-23 @ 06:10), Max: 37.2 (12-28-23 @ 10:35)  HR: 60 (12-29-23 @ 06:10) (60 - 85)  BP: 114/95 (12-29-23 @ 06:10) (112/71 - 146/98)  RR: 18 (12-29-23 @ 06:10) (17 - 18)  SpO2: 98% (12-29-23 @ 06:10) (98% - 99%)  Wt(kg): --    I&O's Summary      General:  Alert and Oriented * 3.   Head: Normocephalic and atraumatic.   Neck: No JVD. No bruits. Supple. Does not appear to be enlarged.   Cardiovascular: + S1,S2 ; RRR Soft systolic murmur at the left lower sternal border. No rubs noted.    Lungs: CTA b/l. No rhonchi, rales or wheezes.   Abdomen: + BS, soft. Non tender. Non distended. No rebound. No guarding.   Extremities: No clubbing/cyanosis/edema.   Neurologic: Moves all four extremities. Full range of motion.   Skin: Warm and moist. The patient's skin has normal elasticity and good skin turgor.   Psychiatric: Appropriate mood and affect.  Musculoskeletal: Normal range of motion, normal strength     TELEMETRY: 	  NSR    ECG:  	NSR    Stress 06/2023  Normal myocardial perfusion SPECT images No evidence of stress induced ischemia or infarction.  Normal left ventricular size and function.  Calculated EF is: 65%     Holter 06/2023  Sinus rhythm.  Rare APCs and VPCs    TTE  1. Normal left ventricular size and function.  Mild diastolic dysfunction.  2. Normal left atrial size  3. Right atrial cavity is normal in size.  4. Normal right ventricular size and function.  5. Normal trileaflet aortic valve opening.  6. Normal mitral valve opening.  7. Normal appearing tricuspid valve with mild tricuspid  regurgitation.  8. Pulmonic valve is grossly normal, yet poorly visualized  with no doppler evidence for pulmonic stenosis.  9. No evidence of significant pericardial effusion.  10. The aortic root is normal.  11. Normal pulmonary artery.  12. IVC is normal with respiratory variation.  FULL STUDY DONE INCLUDING M-MODE RECORDING,  SPECTRAL DOPPLER AND COLOR FLOW  ECHOCARDIOGRAPHY    TTE 12/28/2023      1. Left ventricular cavity is normal. Left ventricular wall thickness is normal. Left ventricular systolic function is normal with an ejection fraction of 63 % by Fong's method of disks. There are no regional wall motion abnormalities seen.   2. Normal left ventricular diastolic function, with normal filling pressure.   3. Normal right ventricular cavity size, wall thickness, and systolic function.   4. No significant valvular disease.   5. No pericardial effusion seen.        ASSESSMENT/PLAN: Pt is a 43M,  pmh of [facility], who is sent from office for central burning chest pain that can get worse with food. He also reports an exertional component and some SOB. 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 Pain is left sided, non radiating denies any palpitations, orthopnea, PND or recent fevers. HE does not smoke, denies any illicit drug use No belly pain, nvd, dysuria, hematuria, recent travel, trauma, syncope.    1. Chest Pain  - atypical with typical features  - trop negative, EKG non ischemic, recent negative stress where he exercised for almost 12 minus and had no perfusion defects on stress imaging  - check CCTA for atypical pain and risk factors  - start Metoprolol 25 q 6 hours to lower heart rate for CCTA, current heart rate in  60s  - c/w PPI  - c/w Amlodipine  - if CCTA negative will start NSAIDS for MSK pain  - will start statin as outpatient in setting of current increased LFTs and hepatic steatosis  - DM2 tx per medicine/endocrine    Maria Luisa Geller MD  Pager: [phone]        DATE OF SERVICE: 12-29-23    Patient denies chest pain or shortness of breath.   Review of symptoms otherwise negative.    MEDICATIONS:  acetaminophen     Tablet .. 650 milliGRAM(s) Oral every 6 hours PRN  aspirin enteric coated 81 milliGRAM(s) Oral daily  dextrose 5%. 1000 milliLiter(s) IV Continuous <Continuous>  dextrose 5%. 1000 milliLiter(s) IV Continuous <Continuous>  dextrose 50% Injectable 12.5 Gram(s) IV Push once  dextrose 50% Injectable 25 Gram(s) IV Push once  dextrose 50% Injectable 25 Gram(s) IV Push once  dextrose Oral Gel 15 Gram(s) Oral once PRN  glucagon  Injectable 1 milliGRAM(s) IntraMuscular once  influenza   Vaccine 0.5 milliLiter(s) IntraMuscular once  insulin glargine Injectable (LANTUS) 16 Unit(s) SubCutaneous at bedtime  insulin lispro (ADMELOG) corrective regimen sliding scale   SubCutaneous three times a day before meals  insulin lispro (ADMELOG) corrective regimen sliding scale   SubCutaneous at bedtime  insulin lispro Injectable (ADMELOG) 4 Unit(s) SubCutaneous three times a day before meals  metoprolol tartrate 25 milliGRAM(s) Oral every 6 hours  morphine  - Injectable 2 milliGRAM(s) IV Push every 4 hours PRN  oxycodone    5 mG/acetaminophen 325 mG 1 Tablet(s) Oral every 4 hours PRN  pantoprazole    Tablet 40 milliGRAM(s) Oral before breakfast  sodium chloride 0.9%. 1000 milliLiter(s) IV Continuous <Continuous>      LABS:                        15.6   10.97 )-----------( 257      ( 29 Dec 2023 05:33 )             46.6       Hemoglobin: 15.6 g/dL (12-29 @ 05:33)  Hemoglobin: 16.2 g/dL (12-27 @ 15:49)      12-29    135  |  100  |  14  ----------------------------<  177<H>  3.8   |  24  |  0.82    Ca    8.4      29 Dec 2023 05:33  Phos  3.6     12-29  Mg     2.00     12-29    TPro  7.2  /  Alb  3.8  /  TBili  0.4  /  DBili  x   /  AST  63<H>  /  ALT  104<H>  /  AlkPhos  103  12-28    Creatinine Trend: 0.82<--, 0.74<--, 0.72<--      PHYSICAL EXAM:  T(C): 36.3 (12-29-23 @ 06:10), Max: 37.2 (12-28-23 @ 10:35)  HR: 60 (12-29-23 @ 06:10) (60 - 85)  BP: 114/95 (12-29-23 @ 06:10) (112/71 - 146/98)  RR: 18 (12-29-23 @ 06:10) (17 - 18)  SpO2: 98% (12-29-23 @ 06:10) (98% - 99%)  Wt(kg): --    I&O's Summary      General:  Alert and Oriented * 3.   Head: Normocephalic and atraumatic.   Neck: No JVD. No bruits. Supple. Does not appear to be enlarged.   Cardiovascular: + S1,S2 ; RRR Soft systolic murmur at the left lower sternal border. No rubs noted.    Lungs: CTA b/l. No rhonchi, rales or wheezes.   Abdomen: + BS, soft. Non tender. Non distended. No rebound. No guarding.   Extremities: No clubbing/cyanosis/edema.   Neurologic: Moves all four extremities. Full range of motion.   Skin: Warm and moist. The patient's skin has normal elasticity and good skin turgor.   Psychiatric: Appropriate mood and affect.  Musculoskeletal: Normal range of motion, normal strength     TELEMETRY: 	  NSR    ECG:  	NSR    Stress 06/2023  Normal myocardial perfusion SPECT images No evidence of stress induced ischemia or infarction.  Normal left ventricular size and function.  Calculated EF is: 65%     Holter 06/2023  Sinus rhythm.  Rare APCs and VPCs    TTE  1. Normal left ventricular size and function.  Mild diastolic dysfunction.  2. Normal left atrial size  3. Right atrial cavity is normal in size.  4. Normal right ventricular size and function.  5. Normal trileaflet aortic valve opening.  6. Normal mitral valve opening.  7. Normal appearing tricuspid valve with mild tricuspid  regurgitation.  8. Pulmonic valve is grossly normal, yet poorly visualized  with no doppler evidence for pulmonic stenosis.  9. No evidence of significant pericardial effusion.  10. The aortic root is normal.  11. Normal pulmonary artery.  12. IVC is normal with respiratory variation.  FULL STUDY DONE INCLUDING M-MODE RECORDING,  SPECTRAL DOPPLER AND COLOR FLOW  ECHOCARDIOGRAPHY    TTE 12/28/2023      1. Left ventricular cavity is normal. Left ventricular wall thickness is normal. Left ventricular systolic function is normal with an ejection fraction of 63 % by Fong's method of disks. There are no regional wall motion abnormalities seen.   2. Normal left ventricular diastolic function, with normal filling pressure.   3. Normal right ventricular cavity size, wall thickness, and systolic function.   4. No significant valvular disease.   5. No pericardial effusion seen.        ASSESSMENT/PLAN: Pt is a 43M,  pmh of [facility], who is sent from office for central burning chest pain that can get worse with food. He also reports an exertional component and some SOB. 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 Pain is left sided, non radiating denies any palpitations, orthopnea, PND or recent fevers. HE does not smoke, denies any illicit drug use No belly pain, nvd, dysuria, hematuria, recent travel, trauma, syncope.    1. Chest Pain  - atypical with typical features  - trop negative, EKG non ischemic, recent negative stress where he exercised for almost 12 minus and had no perfusion defects on stress imaging  - check CCTA for atypical pain and risk factors  - start Metoprolol 25 q 6 hours to lower heart rate for CCTA, current heart rate in  60s  - c/w PPI  - c/w Amlodipine  - if CCTA negative will start NSAIDS for MSK pain  - will start statin as outpatient in setting of current increased LFTs and hepatic steatosis and alcohol use  - DM2 tx per medicine/endocrine    Maria Luisa Geller MD  Pager: [phone]        DATE OF SERVICE: 12-29-23    Patient denies chest pain or shortness of breath.   Review of symptoms otherwise negative.    MEDICATIONS:  acetaminophen     Tablet .. 650 milliGRAM(s) Oral every 6 hours PRN  aspirin enteric coated 81 milliGRAM(s) Oral daily  dextrose 5%. 1000 milliLiter(s) IV Continuous <Continuous>  dextrose 5%. 1000 milliLiter(s) IV Continuous <Continuous>  dextrose 50% Injectable 12.5 Gram(s) IV Push once  dextrose 50% Injectable 25 Gram(s) IV Push once  dextrose 50% Injectable 25 Gram(s) IV Push once  dextrose Oral Gel 15 Gram(s) Oral once PRN  glucagon  Injectable 1 milliGRAM(s) IntraMuscular once  influenza   Vaccine 0.5 milliLiter(s) IntraMuscular once  insulin glargine Injectable (LANTUS) 16 Unit(s) SubCutaneous at bedtime  insulin lispro (ADMELOG) corrective regimen sliding scale   SubCutaneous three times a day before meals  insulin lispro (ADMELOG) corrective regimen sliding scale   SubCutaneous at bedtime  insulin lispro Injectable (ADMELOG) 4 Unit(s) SubCutaneous three times a day before meals  metoprolol tartrate 25 milliGRAM(s) Oral every 6 hours  morphine  - Injectable 2 milliGRAM(s) IV Push every 4 hours PRN  oxycodone    5 mG/acetaminophen 325 mG 1 Tablet(s) Oral every 4 hours PRN  pantoprazole    Tablet 40 milliGRAM(s) Oral before breakfast  sodium chloride 0.9%. 1000 milliLiter(s) IV Continuous <Continuous>      LABS:                        15.6   10.97 )-----------( 257      ( 29 Dec 2023 05:33 )             46.6       Hemoglobin: 15.6 g/dL (12-29 @ 05:33)  Hemoglobin: 16.2 g/dL (12-27 @ 15:49)      12-29    135  |  100  |  14  ----------------------------<  177<H>  3.8   |  24  |  0.82    Ca    8.4      29 Dec 2023 05:33  Phos  3.6     12-29  Mg     2.00     12-29    TPro  7.2  /  Alb  3.8  /  TBili  0.4  /  DBili  x   /  AST  63<H>  /  ALT  104<H>  /  AlkPhos  103  12-28    Creatinine Trend: 0.82<--, 0.74<--, 0.72<--      PHYSICAL EXAM:  T(C): 36.3 (12-29-23 @ 06:10), Max: 37.2 (12-28-23 @ 10:35)  HR: 60 (12-29-23 @ 06:10) (60 - 85)  BP: 114/95 (12-29-23 @ 06:10) (112/71 - 146/98)  RR: 18 (12-29-23 @ 06:10) (17 - 18)  SpO2: 98% (12-29-23 @ 06:10) (98% - 99%)  Wt(kg): --    I&O's Summary      General:  Alert and Oriented * 3.   Head: Normocephalic and atraumatic.   Neck: No JVD. No bruits. Supple. Does not appear to be enlarged.   Cardiovascular: + S1,S2 ; RRR Soft systolic murmur at the left lower sternal border. No rubs noted.    Lungs: CTA b/l. No rhonchi, rales or wheezes.   Abdomen: + BS, soft. Non tender. Non distended. No rebound. No guarding.   Extremities: No clubbing/cyanosis/edema.   Neurologic: Moves all four extremities. Full range of motion.   Skin: Warm and moist. The patient's skin has normal elasticity and good skin turgor.   Psychiatric: Appropriate mood and affect.  Musculoskeletal: Normal range of motion, normal strength     TELEMETRY: 	  NSR    ECG:  	NSR    Stress 06/2023  Normal myocardial perfusion SPECT images No evidence of stress induced ischemia or infarction.  Normal left ventricular size and function.  Calculated EF is: 65%     Holter 06/2023  Sinus rhythm.  Rare APCs and VPCs    TTE  1. Normal left ventricular size and function.  Mild diastolic dysfunction.  2. Normal left atrial size  3. Right atrial cavity is normal in size.  4. Normal right ventricular size and function.  5. Normal trileaflet aortic valve opening.  6. Normal mitral valve opening.  7. Normal appearing tricuspid valve with mild tricuspid  regurgitation.  8. Pulmonic valve is grossly normal, yet poorly visualized  with no doppler evidence for pulmonic stenosis.  9. No evidence of significant pericardial effusion.  10. The aortic root is normal.  11. Normal pulmonary artery.  12. IVC is normal with respiratory variation.  FULL STUDY DONE INCLUDING M-MODE RECORDING,  SPECTRAL DOPPLER AND COLOR FLOW  ECHOCARDIOGRAPHY    TTE 12/28/2023      1. Left ventricular cavity is normal. Left ventricular wall thickness is normal. Left ventricular systolic function is normal with an ejection fraction of 63 % by Fong's method of disks. There are no regional wall motion abnormalities seen.   2. Normal left ventricular diastolic function, with normal filling pressure.   3. Normal right ventricular cavity size, wall thickness, and systolic function.   4. No significant valvular disease.   5. No pericardial effusion seen.        ASSESSMENT/PLAN: Pt is a 43M,  pmh of [facility], who is sent from office for central burning chest pain that can get worse with food. He also reports an exertional component and some SOB. 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 Pain is left sided, non radiating denies any palpitations, orthopnea, PND or recent fevers. HE does not smoke, denies any illicit drug use No belly pain, nvd, dysuria, hematuria, recent travel, trauma, syncope.    1. Chest Pain  - atypical with typical features  - trop negative, EKG non ischemic, recent negative stress where he exercised for almost 12 minus and had no perfusion defects on stress imaging  - check CCTA for atypical pain and risk factors  - start Metoprolol 25 q 6 hours to lower heart rate for CCTA, current heart rate in  60s  - c/w PPI  - c/w Amlodipine  - if CCTA negative will start NSAIDS for MSK pain  - will start statin as outpatient in setting of current increased LFTs and hepatic steatosis and alcohol use  - DM2 tx per medicine/endocrine    Maria Luisa Geller MD  Pager: [phone]

## 2023-12-29 NOTE — PROGRESS NOTE ADULT - SUBJECTIVE AND OBJECTIVE BOX
EP Attending  HISTORY OF PRESENT ILLNESS: HPI:  Mr Marshall is a very pleasant 43yoM who presents to our office with worsening/progressive chest discomfort.  Described as heaviness that is substernal/mid-chest, nonradiating, occuring while climbing stairs at work.  He is a .  Has HTN, and abdominal obesity.  Takes Amlodipine for HTN and PPI for GERD.  12/28- resting in bed, no angina. awaiting CCTA tomorrow.  HR in 60s.  Date of service 12/29- resting comfortably, awaiting CCTA today.    PAST MEDICAL & SURGICAL HISTORY:  HTN  Obesity    acetaminophen     Tablet .. 650 milliGRAM(s) Oral every 6 hours PRN  aspirin enteric coated 81 milliGRAM(s) Oral daily  dextrose 5%. 1000 milliLiter(s) IV Continuous <Continuous>  dextrose 5%. 1000 milliLiter(s) IV Continuous <Continuous>  dextrose 50% Injectable 12.5 Gram(s) IV Push once  dextrose 50% Injectable 25 Gram(s) IV Push once  dextrose 50% Injectable 25 Gram(s) IV Push once  dextrose Oral Gel 15 Gram(s) Oral once PRN  glucagon  Injectable 1 milliGRAM(s) IntraMuscular once  influenza   Vaccine 0.5 milliLiter(s) IntraMuscular once  insulin glargine Injectable (LANTUS) 16 Unit(s) SubCutaneous at bedtime  insulin lispro (ADMELOG) corrective regimen sliding scale   SubCutaneous at bedtime  insulin lispro (ADMELOG) corrective regimen sliding scale   SubCutaneous three times a day before meals  insulin lispro Injectable (ADMELOG) 4 Unit(s) SubCutaneous three times a day before meals  metoprolol tartrate 25 milliGRAM(s) Oral every 6 hours  morphine  - Injectable 2 milliGRAM(s) IV Push every 4 hours PRN  oxycodone    5 mG/acetaminophen 325 mG 1 Tablet(s) Oral every 4 hours PRN  pantoprazole    Tablet 40 milliGRAM(s) Oral before breakfast  sodium chloride 0.9%. 1000 milliLiter(s) IV Continuous <Continuous>                        15.6   10.97 )-----------( 257      ( 29 Dec 2023 05:33 )             46.6       12-29    135  |  100  |  14  ----------------------------<  177<H>  3.8   |  24  |  0.82    Ca    8.4      29 Dec 2023 05:33  Phos  3.6     12-29  Mg     2.00     12-29    TPro  7.2  /  Alb  3.8  /  TBili  0.4  /  DBili  x   /  AST  63<H>  /  ALT  104<H>  /  AlkPhos  103  12-28    T(C): 36.3 (12-29-23 @ 06:10), Max: 37.2 (12-28-23 @ 10:35)  HR: 60 (12-29-23 @ 06:10) (60 - 85)  BP: 114/95 (12-29-23 @ 06:10) (112/71 - 146/98)  RR: 18 (12-29-23 @ 06:10) (17 - 18)  SpO2: 98% (12-29-23 @ 06:10) (98% - 99%)  Wt(kg): --    I&O's Summary    General: Well nourished, no acute distress, alert and oriented x 3  Head: normocephalic, no trauma  Neck: no JVD, no bruit, supple, not enlarged  CV: S1S2, no S3, regular rate, rhythm is SINUS, no murmurs.    Lungs: clear BL, no rales or wheezes  Abdomen: bowel sounds +, soft, nontender, nondistended  Extremities: no clubbing, cyanosis or edema  Neuro: Moves all 4 extremities, sensation intact x 4 extremities  Skin: warm and moist, normal turgor  Psych: Mood and affect are appropriate for circumstances  MSK: normal range of motion and strength x4 extremities.    TELEMETRY: NSR	    ECG: NSR, lateral T-waves flat. 	  Echo: NSR  NST: normal perfusion, 11.5min of exercise on Martir protocol without ischemic changes on EKG.  	    ASSESSMENT/PLAN: Mr Marshall is a very pleasant 43y male here with persistent, progressive chest pain.  Concerning for angina, which is in contrast to his reassuring office workup with normal echo, and good exercise capacity on treadmill / normal nuclear myocardial perfusion imaging.  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  Continue  metoprolol to slow heartrate down towards 60.  Holding parameter for drug set at 50bpm.  If we find any coronary calcium, will have evidence to start Statin therapy early.  Hopefully, no multivessel CAD (patient concerned about CAD due to demographics and family history).  Will follow with you.      Myles Keller M.D.  Cardiac Electrophysiology    office [phone]  pager [phone] EP Attending  HISTORY OF PRESENT ILLNESS: HPI:  Mr Marshall is a very pleasant 43yoM who presents to our office with worsening/progressive chest discomfort.  Described as heaviness that is substernal/mid-chest, nonradiating, occuring while climbing stairs at work.  He is a .  Has HTN, and abdominal obesity.  Takes Amlodipine for HTN and PPI for GERD.  12/28- resting in bed, no angina. awaiting CCTA tomorrow.  HR in 60s.  Date of service 12/29- resting comfortably, awaiting CCTA today.    PAST MEDICAL & SURGICAL HISTORY:  HTN  Obesity    acetaminophen     Tablet .. 650 milliGRAM(s) Oral every 6 hours PRN  aspirin enteric coated 81 milliGRAM(s) Oral daily  dextrose 5%. 1000 milliLiter(s) IV Continuous <Continuous>  dextrose 5%. 1000 milliLiter(s) IV Continuous <Continuous>  dextrose 50% Injectable 12.5 Gram(s) IV Push once  dextrose 50% Injectable 25 Gram(s) IV Push once  dextrose 50% Injectable 25 Gram(s) IV Push once  dextrose Oral Gel 15 Gram(s) Oral once PRN  glucagon  Injectable 1 milliGRAM(s) IntraMuscular once  influenza   Vaccine 0.5 milliLiter(s) IntraMuscular once  insulin glargine Injectable (LANTUS) 16 Unit(s) SubCutaneous at bedtime  insulin lispro (ADMELOG) corrective regimen sliding scale   SubCutaneous at bedtime  insulin lispro (ADMELOG) corrective regimen sliding scale   SubCutaneous three times a day before meals  insulin lispro Injectable (ADMELOG) 4 Unit(s) SubCutaneous three times a day before meals  metoprolol tartrate 25 milliGRAM(s) Oral every 6 hours  morphine  - Injectable 2 milliGRAM(s) IV Push every 4 hours PRN  oxycodone    5 mG/acetaminophen 325 mG 1 Tablet(s) Oral every 4 hours PRN  pantoprazole    Tablet 40 milliGRAM(s) Oral before breakfast  sodium chloride 0.9%. 1000 milliLiter(s) IV Continuous <Continuous>                        15.6   10.97 )-----------( 257      ( 29 Dec 2023 05:33 )             46.6       12-29    135  |  100  |  14  ----------------------------<  177<H>  3.8   |  24  |  0.82    Ca    8.4      29 Dec 2023 05:33  Phos  3.6     12-29  Mg     2.00     12-29    TPro  7.2  /  Alb  3.8  /  TBili  0.4  /  DBili  x   /  AST  63<H>  /  ALT  104<H>  /  AlkPhos  103  12-28    T(C): 36.3 (12-29-23 @ 06:10), Max: 37.2 (12-28-23 @ 10:35)  HR: 60 (12-29-23 @ 06:10) (60 - 85)  BP: 114/95 (12-29-23 @ 06:10) (112/71 - 146/98)  RR: 18 (12-29-23 @ 06:10) (17 - 18)  SpO2: 98% (12-29-23 @ 06:10) (98% - 99%)  Wt(kg): --    I&O's Summary    General: Well nourished, no acute distress, alert and oriented x 3  Head: normocephalic, no trauma  Neck: no JVD, no bruit, supple, not enlarged  CV: S1S2, no S3, regular rate, rhythm is SINUS, no murmurs.    Lungs: clear BL, no rales or wheezes  Abdomen: bowel sounds +, soft, nontender, nondistended  Extremities: no clubbing, cyanosis or edema  Neuro: Moves all 4 extremities, sensation intact x 4 extremities  Skin: warm and moist, normal turgor  Psych: Mood and affect are appropriate for circumstances  MSK: normal range of motion and strength x4 extremities.    TELEMETRY: NSR	    ECG: NSR, lateral T-waves flat. 	  Echo: NSR  NST: normal perfusion, 11.5min of exercise on Martir protocol without ischemic changes on EKG.  	    ASSESSMENT/PLAN: Mr Marshall is a very pleasant 43y male here with persistent, progressive chest pain.  Concerning for angina, which is in contrast to his reassuring office workup with normal echo, and good exercise capacity on treadmill / normal nuclear myocardial perfusion imaging.  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  Continue  metoprolol to slow heartrate down towards 60.  Holding parameter for drug set at 50bpm.  If we find any coronary calcium, will have evidence to start Statin therapy early.  Hopefully, no multivessel CAD (patient concerned about CAD due to demographics and family history).  Will follow with you.      Myles Keller M.D.  Cardiac Electrophysiology    office [phone]  pager [phone]

## 2023-12-29 NOTE — DIETITIAN INITIAL EVALUATION ADULT - PERTINENT MEDS FT
MEDICATIONS  (STANDING):  aspirin enteric coated 81 milliGRAM(s) Oral daily  dextrose 5%. 1000 milliLiter(s) (50 mL/Hr) IV Continuous <Continuous>  dextrose 5%. 1000 milliLiter(s) (100 mL/Hr) IV Continuous <Continuous>  dextrose 50% Injectable 12.5 Gram(s) IV Push once  dextrose 50% Injectable 25 Gram(s) IV Push once  dextrose 50% Injectable 25 Gram(s) IV Push once  glucagon  Injectable 1 milliGRAM(s) IntraMuscular once  influenza   Vaccine 0.5 milliLiter(s) IntraMuscular once  insulin glargine Injectable (LANTUS) 16 Unit(s) SubCutaneous at bedtime  insulin lispro (ADMELOG) corrective regimen sliding scale   SubCutaneous at bedtime  insulin lispro (ADMELOG) corrective regimen sliding scale   SubCutaneous three times a day before meals  insulin lispro Injectable (ADMELOG) 4 Unit(s) SubCutaneous three times a day before meals  metoprolol tartrate 25 milliGRAM(s) Oral every 6 hours  pantoprazole    Tablet 40 milliGRAM(s) Oral before breakfast  sodium chloride 0.9%. 1000 milliLiter(s) (80 mL/Hr) IV Continuous <Continuous>  tamsulosin 0.4 milliGRAM(s) Oral at bedtime    MEDICATIONS  (PRN):  acetaminophen     Tablet .. 650 milliGRAM(s) Oral every 6 hours PRN Temp greater or equal to 38C (100.4F), Mild Pain (1 - 3)  dextrose Oral Gel 15 Gram(s) Oral once PRN Blood Glucose LESS THAN 70 milliGRAM(s)/deciliter  morphine  - Injectable 2 milliGRAM(s) IV Push every 4 hours PRN Severe Pain (7 - 10)  oxycodone    5 mG/acetaminophen 325 mG 1 Tablet(s) Oral every 4 hours PRN Moderate Pain (4 - 6)

## 2023-12-29 NOTE — DIETITIAN INITIAL EVALUATION ADULT - REASON FOR ADMISSION
Other chest pain    Patient is a 43y Male with PMH pre-diabetes mellitus, HTN is sent from cardiology clinic for possible admission. For the past several months, reports L sided chest pain.

## 2023-12-29 NOTE — DISCHARGE NOTE PROVIDER - NSDCCPCAREPLAN_GEN_ALL_CORE_FT
PRINCIPAL DISCHARGE DIAGNOSIS  Diagnosis: Chest pain  Assessment and Plan of Treatment: You came to the hospital with chest pain. You had a CT scan of your coronary arteries which showed no evidence of atherosclerotic plaque or stenosis. Please start Lipitor 20mg once a day on discharge. Follow-up with your cardiologist and primary care doctor within 1-2 weeks of discharge.      SECONDARY DISCHARGE DIAGNOSES  Diagnosis: DM (diabetes mellitus)  Assessment and Plan of Treatment: Your A1c was 9.5%. You were seen by endocrinology while in the hospital. PLEASE DO NOT START TAKING METFORMIN UNTIL JANUARY 2ND. Please taking 500mg twice a day for one week with food, then increased to 1000mg twice a day.   Continue consistent carbohydrate diet.  Monitor blood glucose levels throughout the day before meals and at bedtime.  Record blood sugars and bring to outpatient providers appointment in order to be reviewed by your doctor for management modifications.  Be aware of diabetes management symptoms including feeling cool and clammy may be related to low glucose levels.  Feeling hot and dry may indicate high glucose levels.  If  you feel these symptoms, check your blood sugar.  Make regular podiatry appointments in order to have feet checked for wounds and toe nails cut by a doctor to prevent infections.    Diagnosis: Transaminitis  Assessment and Plan of Treatment: Your liver enzymes were noted to be elevated while in the hospital. You had an US which showed fatty liver. Please follow-up for a repeat comprehensive metabolic panel within 1-2 weeks of discharge.    Diagnosis: Renal stone  Assessment and Plan of Treatment: You had a CT scan which showed a left kidney stone. Please take flomax as directed. Follow-up with your urologist within 1-2 weeks of discharge.     PRINCIPAL DISCHARGE DIAGNOSIS  Diagnosis: Chest pain  Assessment and Plan of Treatment: You came to the hospital with chest pain. You had a CT scan of your coronary arteries which showed no evidence of atherosclerotic plaque or stenosis. Please start Lipitor 20mg once a day on discharge. you may take Tylenol as needed for pain. Follow-up with your cardiologist and primary care doctor within 1-2 weeks of discharge.      SECONDARY DISCHARGE DIAGNOSES  Diagnosis: DM (diabetes mellitus)  Assessment and Plan of Treatment: Your A1c was 9.5%. You were seen by endocrinology while in the hospital. PLEASE DO NOT START TAKING METFORMIN UNTIL JANUARY 2ND. Please taking 500mg twice a day for one week with food, then increased to 1000mg twice a day.   Continue consistent carbohydrate diet.  Monitor blood glucose levels throughout the day before meals and at bedtime.  Record blood sugars and bring to outpatient providers appointment in order to be reviewed by your doctor for management modifications.  Be aware of diabetes management symptoms including feeling cool and clammy may be related to low glucose levels.  Feeling hot and dry may indicate high glucose levels.  If  you feel these symptoms, check your blood sugar.  Make regular podiatry appointments in order to have feet checked for wounds and toe nails cut by a doctor to prevent infections.    Diagnosis: Transaminitis  Assessment and Plan of Treatment: Your liver enzymes were noted to be elevated while in the hospital. You had an US which showed fatty liver. Please follow-up for a repeat comprehensive metabolic panel within 1-2 weeks of discharge.    Diagnosis: Renal stone  Assessment and Plan of Treatment: You had a CT scan which showed a left kidney stone. Please take flomax as directed. Follow-up with your urologist within 1-2 weeks of discharge.

## 2024-01-02 RX ORDER — METFORMIN HYDROCHLORIDE 850 MG/1
1 TABLET ORAL
Qty: 120 | Refills: 0
Start: 2024-01-02 | End: 2024-03-01

## 2024-03-27 NOTE — H&P ADULT - ASSESSMENT
[TextEntry] : Reassurance about all skin growths SPF 30+ sunscreen recommended for usage when outside  Return 1 year  Keyla, medical assistant, served as chaperone and was present for the entire skin exam. 43M, preDM, ?HTN (no meds), who is sent from cardiology clinic for possible admission. For the past several months, reports L sided chest pain. Non-radiating. Non-smoker. Denies illicits. Denies hx of VTE. No leg swelling. Pain is becoming more frequent and thus he was sent from clinic ([facility]) for possible admission. No belly pain, nvd, dysuria, hematuria, recent travel, trauma, syncope.
